# Patient Record
Sex: MALE | Race: WHITE | NOT HISPANIC OR LATINO | URBAN - METROPOLITAN AREA
[De-identification: names, ages, dates, MRNs, and addresses within clinical notes are randomized per-mention and may not be internally consistent; named-entity substitution may affect disease eponyms.]

---
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
## 2024-09-11 ENCOUNTER — INPATIENT (INPATIENT)
Facility: HOSPITAL | Age: 19
LOS: 4 days | Discharge: HOME | DRG: 917 | End: 2024-09-16
Attending: HOSPITALIST | Admitting: HOSPITALIST
Payer: COMMERCIAL

## 2024-09-11 VITALS
SYSTOLIC BLOOD PRESSURE: 161 MMHG | OXYGEN SATURATION: 98 % | WEIGHT: 240.08 LBS | HEIGHT: 76 IN | DIASTOLIC BLOOD PRESSURE: 96 MMHG | RESPIRATION RATE: 18 BRPM | TEMPERATURE: 99 F | HEART RATE: 93 BPM

## 2024-09-11 PROCEDURE — 99285 EMERGENCY DEPT VISIT HI MDM: CPT

## 2024-09-11 NOTE — ED PROVIDER NOTE - PHYSICAL EXAMINATION
Vital Signs: I have reviewed the initial vital signs.  CONSTITUTIONAL: Pt in no acute distress  SKIN: Skin exam is warm and dry, no acute rash.  HEAD: Normocephalic; atraumatic.  EYES: PERRL, EOM intact; conjunctiva and sclera clear.  ENT: No nasal discharge; airway clear. Oropharynx normal.  NECK: Supple; FROM  CARD: S1, S2 normal; no murmurs, gallops, or rubs. Regular rate and rhythm.  RESP: CTAB. No wheezes, rales or rhonchi.  ABD:  soft; non-distended; non-tender; no hepatosplenomegaly.  MSK: Normal ROM. No clubbing, cyanosis or edema.  NEURO: Alert, oriented. Grossly unremarkable. No focal deficits.  PSYCH: Cooperative, appropriate.

## 2024-09-11 NOTE — ED PROVIDER NOTE - EKG/XRAY ADDITIONAL INFORMATION
EKG reviewed by me Dr. Brewer and shows, sinus rhythm, MO/QRS/QTC intervals are in acceptable range, no significant ST/T wave changes noted.

## 2024-09-11 NOTE — ED PROVIDER NOTE - CARE PLAN
Principal Discharge DX:	Medication overdose, intentional self-harm, initial encounter  Secondary Diagnosis:	Suicide attempt   1

## 2024-09-11 NOTE — ED PROVIDER NOTE - ATTENDING APP SHARED VISIT CONTRIBUTION OF CARE
19-year-old male with history of depression, GERD, in the past was on Abilify and Lexapro but has not taken for 1 month, sees psychiatry and therapist regularly, past suicidal attempt and admission, presents with both parents with overdose. Patient states he took 18 pills of 50 mg hydroxyzine at approximately 9:30 PM tonight, due to suicidal thoughts. States initially felt slightly dizzy, however this is resolved. Denies vomiting, abdominal pain, and all other symptoms. On exam, afebrile, hemodynamically stable, saturating well on room air, NAD, well appearing, sitting comfortably in bed, no WOB/tachypnea, speaking full sentences, head NCAT, EOMI grossly, anicteric, MMM, no lesions, RRR, nml S1/S2, no m/r/g, lungs CTAB, no w/r/r, abd soft, NT, ND, nml BS, no rebound or guarding, AAO, CN's 3-12 grossly intact, DA SILVA spontaneously, skin warm, well perfused, no rashes or hives. Patient fully alert and verbal and ambulatory. No evidence of toxidrome/overdose at this time. ECG unremarkable. Toxicology consulted and cleared patient. NAD, well appearing. Signed off care to Dr. BAY Brewer, will f/u labs and psych consult.

## 2024-09-11 NOTE — ED PROVIDER NOTE - OBJECTIVE STATEMENT
19-year-old male past medical history suicide attempt in the past, major depressive disorder, noncompliant with Lexapro and Abilify, takes hydroxyzine 50 mg nightly for sleep, presents after suicide attempt tonight.  Patient states he took 18 pills of 50 mg hydroxyzine 1.5 hours prior to arrival.  Patient only complaining of mild drowsiness and shortness of breath.  Denies chest pain, abdominal pain, N/V.  Patient denies HI.  Patient denies coingestions.

## 2024-09-11 NOTE — ED ADULT TRIAGE NOTE - CHIEF COMPLAINT QUOTE
Pt BIBA from home for suicide attempt   states he took 18 tabs of 50mg hydroxyzine around 930p  now feeling drowsy and nauseous  1:1 initiated in triage

## 2024-09-11 NOTE — ED PROVIDER NOTE - PROGRESS NOTE DETAILS
NAD, well appearing. Signed off care to Dr. BAY Brewer, will f/u labs and psych consult. AE: Patient evaluated by toxicology who recommends obs until the morning for a repeat ECG (evaluation of QTc) and telemetry due to the patient's possible access to escitalopram. Telepsych aware, will eval pt

## 2024-09-11 NOTE — ED PROVIDER NOTE - NSICDXNOFAMILYHX_GEN_ALL_ED
<-- Click to add NO pertinent Family History details… Soft, non-tender, no hepatosplenomegaly, normal bowel sounds detailed exam

## 2024-09-11 NOTE — ED PROVIDER NOTE - CLINICAL SUMMARY MEDICAL DECISION MAKING FREE TEXT BOX
Patient remained stable in ED, labs/EKG findings reviewed and discussed with patient. Discussed with EDOU/OBS provider, patient is admitted to EDOU for further evaluation of her symptoms.

## 2024-09-12 DIAGNOSIS — T50.902A POISONING BY UNSPECIFIED DRUGS, MEDICAMENTS AND BIOLOGICAL SUBSTANCES, INTENTIONAL SELF-HARM, INITIAL ENCOUNTER: ICD-10-CM

## 2024-09-12 DIAGNOSIS — F32.A DEPRESSION, UNSPECIFIED: ICD-10-CM

## 2024-09-12 DIAGNOSIS — T43.591A POISONING BY OTHER ANTIPSYCHOTICS AND NEUROLEPTICS, ACCIDENTAL (UNINTENTIONAL), INITIAL ENCOUNTER: ICD-10-CM

## 2024-09-12 LAB
ALBUMIN SERPL ELPH-MCNC: 5 G/DL — SIGNIFICANT CHANGE UP (ref 3.5–5.2)
ALP SERPL-CCNC: 86 U/L — SIGNIFICANT CHANGE UP (ref 30–115)
ALT FLD-CCNC: 36 U/L — SIGNIFICANT CHANGE UP (ref 0–41)
AMPHET UR-MCNC: NEGATIVE — SIGNIFICANT CHANGE UP
ANION GAP SERPL CALC-SCNC: 12 MMOL/L — SIGNIFICANT CHANGE UP (ref 7–14)
APAP SERPL-MCNC: <5 UG/ML — LOW (ref 10–30)
AST SERPL-CCNC: 26 U/L — SIGNIFICANT CHANGE UP (ref 0–41)
BARBITURATES UR SCN-MCNC: NEGATIVE — SIGNIFICANT CHANGE UP
BASOPHILS # BLD AUTO: 0.06 K/UL — SIGNIFICANT CHANGE UP (ref 0–0.2)
BASOPHILS NFR BLD AUTO: 0.7 % — SIGNIFICANT CHANGE UP (ref 0–1)
BENZODIAZ UR-MCNC: NEGATIVE — SIGNIFICANT CHANGE UP
BILIRUB SERPL-MCNC: 0.2 MG/DL — SIGNIFICANT CHANGE UP (ref 0.2–1.2)
BUN SERPL-MCNC: 10 MG/DL — SIGNIFICANT CHANGE UP (ref 10–20)
CALCIUM SERPL-MCNC: 9.5 MG/DL — SIGNIFICANT CHANGE UP (ref 8.4–10.5)
CHLORIDE SERPL-SCNC: 102 MMOL/L — SIGNIFICANT CHANGE UP (ref 98–110)
CO2 SERPL-SCNC: 24 MMOL/L — SIGNIFICANT CHANGE UP (ref 17–32)
COCAINE METAB.OTHER UR-MCNC: NEGATIVE — SIGNIFICANT CHANGE UP
CREAT SERPL-MCNC: 1 MG/DL — SIGNIFICANT CHANGE UP (ref 0.7–1.5)
EGFR: 110 ML/MIN/1.73M2 — SIGNIFICANT CHANGE UP
EOSINOPHIL # BLD AUTO: 0.21 K/UL — SIGNIFICANT CHANGE UP (ref 0–0.7)
EOSINOPHIL NFR BLD AUTO: 2.3 % — SIGNIFICANT CHANGE UP (ref 0–8)
ETHANOL SERPL-MCNC: <10 MG/DL — SIGNIFICANT CHANGE UP
FENTANYL UR QL: NEGATIVE — SIGNIFICANT CHANGE UP
GLUCOSE SERPL-MCNC: 97 MG/DL — SIGNIFICANT CHANGE UP (ref 70–99)
HCT VFR BLD CALC: 49.6 % — SIGNIFICANT CHANGE UP (ref 42–52)
HGB BLD-MCNC: 16.6 G/DL — SIGNIFICANT CHANGE UP (ref 14–18)
IMM GRANULOCYTES NFR BLD AUTO: 0.3 % — SIGNIFICANT CHANGE UP (ref 0.1–0.3)
LYMPHOCYTES # BLD AUTO: 1.61 K/UL — SIGNIFICANT CHANGE UP (ref 1.2–3.4)
LYMPHOCYTES # BLD AUTO: 17.7 % — LOW (ref 20.5–51.1)
MCHC RBC-ENTMCNC: 30 PG — SIGNIFICANT CHANGE UP (ref 27–31)
MCHC RBC-ENTMCNC: 33.5 G/DL — SIGNIFICANT CHANGE UP (ref 32–37)
MCV RBC AUTO: 89.5 FL — SIGNIFICANT CHANGE UP (ref 80–94)
METHADONE UR-MCNC: NEGATIVE — SIGNIFICANT CHANGE UP
MONOCYTES # BLD AUTO: 0.86 K/UL — HIGH (ref 0.1–0.6)
MONOCYTES NFR BLD AUTO: 9.5 % — HIGH (ref 1.7–9.3)
NEUTROPHILS # BLD AUTO: 6.31 K/UL — SIGNIFICANT CHANGE UP (ref 1.4–6.5)
NEUTROPHILS NFR BLD AUTO: 69.5 % — SIGNIFICANT CHANGE UP (ref 42.2–75.2)
NRBC # BLD: 0 /100 WBCS — SIGNIFICANT CHANGE UP (ref 0–0)
OPIATES UR-MCNC: NEGATIVE — SIGNIFICANT CHANGE UP
PCP SPEC-MCNC: SIGNIFICANT CHANGE UP
PLATELET # BLD AUTO: 268 K/UL — SIGNIFICANT CHANGE UP (ref 130–400)
PMV BLD: 10.5 FL — HIGH (ref 7.4–10.4)
POTASSIUM SERPL-MCNC: 4.2 MMOL/L — SIGNIFICANT CHANGE UP (ref 3.5–5)
POTASSIUM SERPL-SCNC: 4.2 MMOL/L — SIGNIFICANT CHANGE UP (ref 3.5–5)
PROPOXYPHENE QUALITATIVE URINE RESULT: NEGATIVE — SIGNIFICANT CHANGE UP
PROT SERPL-MCNC: 7.6 G/DL — SIGNIFICANT CHANGE UP (ref 6–8)
RBC # BLD: 5.54 M/UL — SIGNIFICANT CHANGE UP (ref 4.7–6.1)
RBC # FLD: 12.4 % — SIGNIFICANT CHANGE UP (ref 11.5–14.5)
SALICYLATES SERPL-MCNC: <0.3 MG/DL — LOW (ref 4–30)
SARS-COV-2 RNA SPEC QL NAA+PROBE: DETECTED
SODIUM SERPL-SCNC: 138 MMOL/L — SIGNIFICANT CHANGE UP (ref 135–146)
WBC # BLD: 9.08 K/UL — SIGNIFICANT CHANGE UP (ref 4.8–10.8)
WBC # FLD AUTO: 9.08 K/UL — SIGNIFICANT CHANGE UP (ref 4.8–10.8)

## 2024-09-12 PROCEDURE — 83735 ASSAY OF MAGNESIUM: CPT

## 2024-09-12 PROCEDURE — 99223 1ST HOSP IP/OBS HIGH 75: CPT

## 2024-09-12 PROCEDURE — 80053 COMPREHEN METABOLIC PANEL: CPT

## 2024-09-12 PROCEDURE — 36415 COLL VENOUS BLD VENIPUNCTURE: CPT

## 2024-09-12 PROCEDURE — 93005 ELECTROCARDIOGRAM TRACING: CPT

## 2024-09-12 PROCEDURE — 93010 ELECTROCARDIOGRAM REPORT: CPT | Mod: 76

## 2024-09-12 PROCEDURE — 99233 SBSQ HOSP IP/OBS HIGH 50: CPT

## 2024-09-12 PROCEDURE — 90792 PSYCH DIAG EVAL W/MED SRVCS: CPT | Mod: 95

## 2024-09-12 PROCEDURE — 85025 COMPLETE CBC W/AUTO DIFF WBC: CPT

## 2024-09-12 RX ORDER — MAGNESIUM, ALUMINUM HYDROXIDE 200-225/5
30 SUSPENSION, ORAL (FINAL DOSE FORM) ORAL EVERY 4 HOURS
Refills: 0 | Status: DISCONTINUED | OUTPATIENT
Start: 2024-09-12 | End: 2024-09-16

## 2024-09-12 RX ORDER — ACETAMINOPHEN 325 MG/1
650 TABLET ORAL EVERY 6 HOURS
Refills: 0 | Status: DISCONTINUED | OUTPATIENT
Start: 2024-09-12 | End: 2024-09-16

## 2024-09-12 NOTE — H&P ADULT - HISTORY OF PRESENT ILLNESS
20yo M w/a PMH of MDD, suicide attempts past, noncompliant with Lexapro and Abilify, takes hydroxyzine 50 mg nightly for sleep, presents after suicide attempt last night.  Patient states he took 18 pills of 50 mg hydroxyzine 1.5 hours prior to arrival yesterday.  Patient only complaining of mild drowsiness and shortness of breath at time but now asymptomatic. He stopped his medications a month and a half ago abruptly  then saw his psych doctor who said its okay to stop the medications since he had no symptoms off of them. He has 4 siblings of which he is friends with. his parents are  and he does not talk much with his dad. He endorses a general disinterest in everything and feels like he isnt good at anything. 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 Endorses some body image issues where people at work the last two days told him he got bigger since coming back. His grandma just went to the nursing home yesterday for placement. However, states this was an impulsive decision.   Denies chest pain, abdominal pain, N/V. Patient denies coingestions.    In the ED,   HDS   labs wnl   Utox negative. COVID +     Admitted for placement into psych.

## 2024-09-12 NOTE — ED ADULT NURSE REASSESSMENT NOTE - NS ED NURSE REASSESS COMMENT FT1
Spoke to mom after she spoke to son's therapist about situation.. Therapist in shock, offered to speak to Dr in hosp if need be-- Therapist --Haylee Colon [phone] // Psychiatrist is Dr. Mcmahan [phone]

## 2024-09-12 NOTE — ED BEHAVIORAL HEALTH ASSESSMENT NOTE - DIFFERENTIAL
major depressive disorder, adjustment disorder, axis 2 pathology, BPD, persistent depressive disorder

## 2024-09-12 NOTE — ED CDU PROVIDER INITIAL DAY NOTE - ATTENDING CONTRIBUTION TO CARE
Patient was signed out to me by Dr. Bowles. Patient remained stable in ED. Labs reviewed and Toxicology on call and Tele Psychiatry on call consulted.   Vitals reviewed.   Lungs: CTA, no wheezing, no crackles.

## 2024-09-12 NOTE — ED CDU PROVIDER DISPOSITION NOTE - CLINICAL COURSE
20yo man h/o depression/anxiety was placed in CDU tox/obs after he presented s/p hydroxyzine ingestion. ED eval was unremarkable, pt was observed and then cleared by tox. Was seen by psych who rec voluntary admission, to which patient is amenable. He was incidentally found to be COVID+, so he was admitted to medicine with psych following.

## 2024-09-12 NOTE — H&P ADULT - ATTENDING COMMENTS
HPI as above.  Interval history: Pt seen and examined at bedside. No cp or sob.  Had sore throat and body aches yesterday, improved today   Vital Signs (24 Hrs):  T(C): 36.4 (09-12-24 @ 07:11), Max: 37.1 (09-11-24 @ 23:14)  HR: 65 (09-12-24 @ 07:11) (65 - 93)  BP: 98/63 (09-12-24 @ 07:11) (98/63 - 161/96)  RR: 18 (09-12-24 @ 07:11) (18 - 18)  SpO2: 97% (09-12-24 @ 07:11) (97% - 98%)  Wt(kg): --  Daily Height in cm: 193.04 (11 Sep 2024 23:14)    Daily     I&O's Summary    PHYSICAL EXAM:  GENERAL: NAD, well-developed  HEAD:  Atraumatic, Normocephalic  EYES: EOMI, PERRLA, conjunctiva and sclera clear  NECK: Supple, No JVD  CHEST/LUNG: Clear to auscultation bilaterally; No wheeze  HEART: Regular rate and rhythm; No murmurs, rubs, or gallops  ABDOMEN: Soft, Nontender, Nondistended; Bowel sounds present  EXTREMITIES:  2+ Peripheral Pulses, No clubbing, cyanosis, or edema  PSYCH: AAOx3  NEUROLOGY: non-focal  SKIN: No rashes or lesions  Labs reviewed      EKG reviewed independently and reviewed read  < from: 12 Lead ECG (09.12.24 @ 03:37) >      Diagnosis Line Normal sinus rhythm  Nonspecific T wave abnormality  Abnormal ECG    < end of copied text >      Plan  #Suicidal attempt- IPP, psych   #covid- asymptomatic- had symptoms yest, may need quarantine depending in IPP   #rest as above. DW resident . agree with plan as above    #Progress Note Handoff  Pending (specify):  follow up psych   Family discussion: house staff updated pt family  Disposition: IPP   Decision to admit the pt is based on acuity as above

## 2024-09-12 NOTE — CONSULT NOTE ADULT - PROBLEM SELECTOR RECOMMENDATION 9
Hydroxyzine is a 1st generation H1-antagonist, and in overdose can cause an sedation, antimuscarinic toxidrome (delirium, agitation, carphologia, tachycardia, mydriasis, urinary retention, dry mucosa, flushed face, hypoactive bowel sounds, seizures) and QRS prolongation.    #Recommendations  - Supportive care, please observe for the above signs and symptoms of toxicity.   - Obtain routine toxicological labs including CBC, CMP, serum acetaminophen, salicylate, ethanol.   - Given access to escitalopram which can cause delayed QTc prolongation and torsades, would observe overnight on telemetry in tox obs and repeat EKG in the morning as long as labs are normal.  - If asymptomatic with normal vitals signs and labs in the AM with normal QTc, cleared from acute toxicological standpoint  - Further medical and/or psychiatric care per primary team    Thank you for involving us in the care of this patient. Assessment and plan discussed with toxicology attending Dr. Andreas Curtis. Please do not hesitate to reach out to the toxicology team for any further questions or concerns.    The On-Call Toxicology Fellow can be reached 24/7 via Pager #[phone]  Please send a 10 digit call back # as [place] cover multiple hospitals    Fidel Tejeda MD  Toxicology Fellow  PGY-4

## 2024-09-12 NOTE — ED BEHAVIORAL HEALTH ASSESSMENT NOTE - DESCRIPTION
calm and cooperative, but somewhat sedated    T(C): 37.1 (09-11-24 @ 23:14)  T(F): 98.7 (09-11-24 @ 23:14), Max: 98.7 (09-11-24 @ 23:14)  HR: 93 (09-11-24 @ 23:14) (93 - 93)  BP: 161/96 (09-11-24 @ 23:14) (161/96 - 161/96)  RR:  (18 - 18)  SpO2:  (98% - 98%)  Wt(kg): -- asthma graduated high school, did some college, works at [employer] lives with mom and sister

## 2024-09-12 NOTE — ED BEHAVIORAL HEALTH ASSESSMENT NOTE - NSBHMSEATTEN_PSY_A_CORE
Time-based billing (NON-critical care)
Normal

## 2024-09-12 NOTE — ED BEHAVIORAL HEALTH ASSESSMENT NOTE - SUMMARY
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    On evaluation patient reports that he overdosed last night in order to die in the context of psychosocial stressors. Although he currently denies suicidal ideation, he has continued depression and he is no longer on psychiatric medications as he d/c Abilify and Lexapro this month. Pt also with some anhedonia and ongoing hopelessness. Due to suicide attempt and ongoing depression pt requires inpatient hospitalization for safety and stabilization. Pt is aware and is agreeable with plan. 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    On evaluation patient reports that he overdosed last night in order to die in the context of psychosocial stressors. Although he currently denies suicidal ideation, he has continued depression and he is no longer on psychiatric medications as he d/c Abilify and Lexapro this month. Pt also with some anhedonia and ongoing hopelessness. Due to suicide attempt and ongoing depression pt requires inpatient hospitalization for safety and stabilization. Pt is aware and is agreeable with plan. Pt will sign 9.13; however, patient also meets criteria for involuntary admission at this time.

## 2024-09-12 NOTE — CONSULT NOTE ADULT - ASSESSMENT
MEDICAL TOXICOLOGY CONSULT    HPI:    This is a 19 year old male presenting for hydroxyzine overdose. Per ED, he reports that 1.5 hours prior to presentation he took intentional overdose of 18 pills of 50mg hydroxyzine. He also is prescribed aripiprazole and escitalopram but denies taking those. He denies any other coingestants. He currently reports feeling drowsy but otherwise asymptomatic. His vitals are normal and on exam he is described as normally awake and alert with normal neuro exam, pupils 3mm and reactive, no flushing of the skin and normal mucous membranes, no rigidity or clonus.    ECG:  rate 79, NSR, QRS 94, QTc 442      PAST MEDICAL & SURGICAL HISTORY:  Asthma      No significant past surgical history          MEDICATION HISTORY:      FAMILY HISTORY:  No pertinent family history in first degree relatives        PHYSICAL EXAM  Vital Signs Last 24 Hrs  T(C): 37.1 (11 Sep 2024 23:14), Max: 37.1 (11 Sep 2024 23:14)  T(F): 98.7 (11 Sep 2024 23:14), Max: 98.7 (11 Sep 2024 23:14)  HR: 93 (11 Sep 2024 23:14) (93 - 93)  BP: 161/96 (11 Sep 2024 23:14) (161/96 - 161/96)  BP(mean): --  RR: 18 (11 Sep 2024 23:14) (18 - 18)  SpO2: 98% (11 Sep 2024 23:14) (98% - 98%)    Parameters below as of 11 Sep 2024 23:14  Patient On (Oxygen Delivery Method): room air        SIGNIFICANT LABORATORY STUDIES:                        16.6   9.08  )-----------( 268      ( 11 Sep 2024 23:52 )             49.6                           RADIOLOGIC STUDIES

## 2024-09-12 NOTE — H&P ADULT - ASSESSMENT
18yo M w/a PMH of MDD, suicide attempts past, noncompliant with Lexapro and Abilify, takes hydroxyzine 50 mg nightly for sleep, presents after suicide attempt last night.  Patient states he took 18 pills of 50 mg hydroxyzine 1.5 hours prior to arrival yesterday.  Admitted for  IPP placement and found to be COVID +.     #Attempted suicide s/p Atarax overdose   - Had 18 pills of Hydroxyzine   - Toxicology following. On ECG,   - Currently asymptomatic. Not agitated, no mydriasis, peeing normally, not constipated. HDS.   - UTOX negative   - psychosocial stressors as per HPI   - Currently no plan to harm himself, states it was an impulsive decision and there was no real reason as to why      Plan   - Psych eval for recs and IPP placement   - Repeat ECG in AM   - 1:1 sit     #COVID   asymptomatic   - Had come cough and sore throat yesterday morning but now asx   - lungs clear to ausculation on room air, HDS.   - monitor off of abx     #HANDOFF    #HCM  - DVT Prophylaxis: SCD  - GI Prophylaxis: NI   - Diet: Regular   - Activity: AAT   - Code Status: Full   - Dispo: Psych, repeat ecg, IPP      Ben Castro   PGY3, Internal Medicine   [facility]   20yo M w/a PMH of MDD, suicide attempts past, noncompliant with Lexapro and Abilify, takes hydroxyzine 50 mg nightly for sleep, presents after suicide attempt last night.  Patient states he took 18 pills of 50 mg hydroxyzine 1.5 hours prior to arrival yesterday.  Admitted for  IPP placement and found to be COVID +.     #Attempted suicide s/p Atarax overdose   - Had 18 pills of Hydroxyzine   - Toxicology following. On ECG,   - Currently asymptomatic. Not agitated, no mydriasis, peeing normally, not constipated. HDS.   - UTOX negative   - psychosocial stressors as per HPI   - Currently no plan to harm himself, states it was an impulsive decision and there was no real reason as to why      Plan   - Psych eval for recs and IPP placement   - Repeat ECG in AM   - 1:1 sit     #COVID   asymptomatic   - Had come cough and sore throat yesterday morning but now asx   - lungs clear to ausculation on room air, HDS.   - monitor off of antibiotics   - depending on IPP rules may need quarantine     #HANDOFF    #HCM  - DVT Prophylaxis: SCD  - GI Prophylaxis: NI   - Diet: Regular   - Activity: AAT   - Code Status: Full   - Dispo: Psych, repeat ecg, IPP      Ben Castro   PGY3, Internal Medicine   [facility]

## 2024-09-12 NOTE — ED BEHAVIORAL HEALTH ASSESSMENT NOTE - RISK ASSESSMENT
Risk factors: suicidality (suicide attempts, frequent SI), depressed mood, stress surrounding work, past IPP, gender, age, stopped medications    Protective factors: has a job, good social supports, currently denies SI/HI, no history of violence, sober

## 2024-09-12 NOTE — ED CDU PROVIDER INITIAL DAY NOTE - CLINICAL SUMMARY MEDICAL DECISION MAKING FREE TEXT BOX
Patient remained stable in ED. Toxicology consulted obtained and recommendations reviewed and placed in EDOU for further evaluation/monitoring.

## 2024-09-12 NOTE — ED BEHAVIORAL HEALTH ASSESSMENT NOTE - HPI (INCLUDE ILLNESS QUALITY, SEVERITY, DURATION, TIMING, CONTEXT, MODIFYING FACTORS, ASSOCIATED SIGNS AND SYMPTOMS)
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     Pt seen and chart reviewed. Pt is calm and cooperative, but somewhat sedated s/p overdose on Hydroxyzine. He states that last night at 9:30pm he tried to kill himself "with a lot of pills of hydroxyzine." He states that he took 18 pills (50mg) in order to die. He stated that he researched the amount of medication needed to die only after he took the pills. Pt states he has done this in the past with different medications. He states that he has frequently wished hew as not alive. Yesterday, he was talking to his brother about life, and said that life is unbearable and it "spiraled" and he decided impulsively to take the overdose. He states that he still feels really sad. He stated that he just started work 2 days ago, after taking a long break. States he felt pressure to return to work and can "barely go to work." He reports good sleep and normal appetite. Denies NSSIB. States that right now he does not have suicidal ideation but he feels very tired. He reports that he has a GF and close friends, but has not been seeing them much recently. He went to [place] this summer with them and it was not as fun as he thought it would be. States that his actions were an "accumulation of stuff." He sees a psychiatrist Dr. Montenegro on [facility] and therapist Vicki. Was taking Abilify, Lexapro, and Vistaril, but a month ago went off Abilify and Lexapro due to concern for weight gain and now is only on Vistaril. Denies ETOH/Weed/illicit substance use. No access to guns. No auditory/visual hallucinations. No overt paranoia. No signs of symptoms of tay. He is not RIS. He denies homicidal ideation. Has never been in a fight and has never been violent.     Collateral received from patient's mother by MSW with patient's permission. See  note for more details.

## 2024-09-12 NOTE — ED ADULT NURSE REASSESSMENT NOTE - NS ED NURSE REASSESS COMMENT FT1
Had conversation with mom-- pt had complaints of cough & congestion yesterday prior to arrival.. When mom went home last night, pt admitted to taking meds to commit suicide. This is 2nd attempt now. First time pt hospitalized and sent to psych unit... MD's working on placement for this visit. ...    No IV present. Pt A+Ox4. As per mom has been sleeping since last night.

## 2024-09-12 NOTE — ED BEHAVIORAL HEALTH ASSESSMENT NOTE - PSYCHIATRIC ISSUES AND PLAN (INCLUDE STANDING AND PRN MEDICATION)
PRNS: Haldol 5/Ativan 2/Bendaryl 50 po/IM for agitation/severe agitation; defer starting standing medications to primary team though patient would benefit from  SSRI or similar medication, collateral from OP psychiatrist in AM will be useful

## 2024-09-12 NOTE — ED BEHAVIORAL HEALTH ASSESSMENT NOTE - OTHER PAST PSYCHIATRIC HISTORY (INCLUDE DETAILS REGARDING ONSET, COURSE OF ILLNESS, INPATIENT/OUTPATIENT TREATMENT)
IPP at [facility] in June 23 for five days and in September 2023 he went to a MH program in [place] for 3-4 weeks

## 2024-09-12 NOTE — H&P ADULT - CLICK TO LAUNCH ORM
Dear Jenni Rogers,     [facility] is committed to your overall health.  Our records indicate that you are due for an annual checkup with your primary care provider,  Dr. Kaylee Foote MD.  Please call [phone] to schedule a routine physical exam.  You can also schedule your appointment via your [facility]. You may also be due for the following test and/or procedures:    Health Maintenance Due   Topic Date Due    TETANUS VACCINE  10/10/1977    Colonoscopy  10/10/2009    Mammogram  06/15/2018       Please be prepared to sign release of information so that we obtain medical records for care that you received outside of [facility]. This is done to make sure your medical record is complete and that you receive the best of care. Thank you.     If you have chosen another Primary Care Physician please contact us so that your medical records can be updated.       If you have had any of the above done at another facility, please let us know by calling [phone]; so that we can update your record.  We will add these results to your chart if you fax them to the fax number listed below.  If you have any questions, please call [phone].    Thanks,       Additional Information  If you have questions, you can email [email or web address] or call [phone]  to talk to our [facility] staff. Remember, [facility] is NOT to be used for urgent needs. For medical emergencies, dial 911.        
.

## 2024-09-12 NOTE — ED BEHAVIORAL HEALTH NOTE - BEHAVIORAL HEALTH NOTE
This writer spoke with patient's mother, Amy ([phone]) to obtain collateral information. Patient is a 20 y.o male presenting to the ED brought in by ambulance following a suicide attempt via overdose. Patient reported to his girlfriend via phone that he had ingested medication; girlfriend then contacted patient's sister, who contacted patient's mother's boyfriend who activated 911. Patient's mother was not home at the time ,but met patient at ED.  Patient has hx of overdose attempt 1.5 yrs ago, per mom, however mom expressed this attempt seemed more severe. Patient is domiciled with mom and sister. Maternal grandma moved out of the home into a nursing facility as of yesterday, which mom believed to be triggering for the patient. Per collateral, she was surprised that patient had attempted to end his life, as she felt he had been doing better and appeared to be happier. Only one IPP admission at [facility] 1.5 years ago following OD attempt. Patient is followed by psychiatrist and therapist at [facility] outpatient ("[facility] on [facility]. Mom was unsure of psychiatrist name and names/dosages of medication. She did report that patient discontinued 2/3 of his medications about 1.5 months ago, as he did not like the side effects. 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 Patient has only been back at work for 2 days. Pt's dx include depression and anxiety. According to mom, patient expressed to his brother, whom he is very close with, that he took the medication in the way that he did believing it would just stop his heart and it would be a peaceful death. Per mom, patient expresses a desire to die, however, is afraid of death. Brother contact information Minesh [phone]. Collateral is concerned for patient's safety and feels he is at risk. This writer spoke with patient's mother, Amy ([phone]) to obtain collateral information. Patient is a 20 y.o male presenting to the ED brought in by ambulance following a suicide attempt via overdose. Patient reported to his girlfriend via phone that he had ingested medication; girlfriend then contacted patient's sister, who contacted patient's mother's boyfriend who activated 911. Patient's mother was not home at the time ,but met patient at ED.  Patient has hx of overdose attempt 1.5 yrs ago, per mom, however mom expressed this attempt seemed more severe. Patient is domiciled with mom and sister. Maternal grandma moved out of the home into a nursing facility as of yesterday, which mom believed to be triggering for the patient. Per collateral, she was surprised that patient had attempted to end his life, as she felt he had been doing better and appeared to be happier. Only one IPP admission at [facility] 1.5 years ago following OD attempt. Patient is followed by psychiatrist and therapist at [facility] outpatient ("[facility] on [facility]. Mom was unsure of psychiatrist name and names/dosages of medication. She did report that patient discontinued 2/3 of his medications about 1.5 months ago, as he did not like the side effects. 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 Patient has only been back at work for 2 days. Pt's dx include depression and anxiety. According to mom, patient expressed to his brother, whom he is very close with, that he took the medication in the way that he did believing it would just stop his heart and it would be a peaceful death. Per mom, patient expresses a desire to die, however, is afraid of death. Brother contact information Minesh [phone]. Collateral is concerned for patient's safety and feels he is at risk.

## 2024-09-12 NOTE — ED ADULT NURSE NOTE - NSFALLUNIVINTERV_ED_ALL_ED
Bed/Stretcher in lowest position, wheels locked, appropriate side rails in place/Call bell, personal items and telephone in reach/Instruct patient to call for assistance before getting out of bed/chair/stretcher/Non-slip footwear applied when patient is off stretcher/[place] to call system/Physically safe environment - no spills, clutter or unnecessary equipment/Purposeful proactive rounding/Room/bathroom lighting operational, light cord in reach

## 2024-09-12 NOTE — H&P ADULT - NSHPLABSRESULTS_GEN_ALL_CORE
.  LABS:                         16.6   9.08  )-----------( 268      ( 11 Sep 2024 23:52 )             49.6     09-11    138  |  102  |  10  ----------------------------<  97  4.2   |  24  |  1.0    Ca    9.5      11 Sep 2024 23:52    TPro  7.6  /  Alb  5.0  /  TBili  0.2  /  DBili  x   /  AST  26  /  ALT  36  /  AlkPhos  86  09-11      Urinalysis Basic - ( 11 Sep 2024 23:52 )    Color: x / Appearance: x / SG: x / pH: x  Gluc: 97 mg/dL / Ketone: x  / Bili: x / Urobili: x   Blood: x / Protein: x / Nitrite: x   Leuk Esterase: x / RBC: x / WBC x   Sq Epi: x / Non Sq Epi: x / Bacteria: x            RADIOLOGY, EKG & ADDITIONAL TESTS: Reviewed.

## 2024-09-12 NOTE — PATIENT PROFILE ADULT - NSPRESCRALCSIXMORE_GEN_A_NUR
RT Inhaler-Nebulizer Bronchodilator Protocol Note    There is a bronchodilator order in the chart from a provider indicating to follow the RT Bronchodilator Protocol and there is an “Initiate RT Inhaler-Nebulizer Bronchodilator Protocol” order as well (see protocol at bottom of note).    CXR Findings:  XR CHEST PORTABLE    Result Date: 3/6/2024  No acute cardiopulmonary process.       The findings from the last RT Protocol Assessment were as follows:   History Pulmonary Disease: Chronic pulmonary disease  Respiratory Pattern: Regular pattern and RR 12-20 bpm  Breath Sounds: Inspiratory and expiratory or bilateral wheezing and/or rhonchi  Cough: Strong, spontaneous, non-productive  Indication for Bronchodilator Therapy: On home bronchodilators  Bronchodilator Assessment Score: 8    Aerosolized bronchodilator medication orders have been revised according to the RT Inhaler-Nebulizer Bronchodilator Protocol below.    Respiratory Therapist to perform RT Therapy Protocol Assessment initially then follow the protocol.  Repeat RT Therapy Protocol Assessment PRN for score 0-3 or on second treatment, BID, and PRN for scores above 3.    No Indications - adjust the frequency to every 6 hours PRN wheezing or bronchospasm, if no treatments needed after 48 hours then discontinue using Per Protocol order mode.     If indication present, adjust the RT bronchodilator orders based on the Bronchodilator Assessment Score as indicated below.  Use Inhaler orders unless patient has one or more of the following: on home nebulizer, not able to hold breath for 10 seconds, is not alert and oriented, cannot activate and use MDI correctly, or respiratory rate 25 breaths per minute or more, then use the equivalent nebulizer order(s) with same Frequency and PRN reasons based on the score.  If a patient is on this medication at home then do not decrease Frequency below that used at home.    0-3 - enter or revise RT bronchodilator order(s) to  Less than monthly

## 2024-09-13 ENCOUNTER — TRANSCRIPTION ENCOUNTER (OUTPATIENT)
Age: 19
End: 2024-09-13

## 2024-09-13 LAB
ALBUMIN SERPL ELPH-MCNC: 4.4 G/DL — SIGNIFICANT CHANGE UP (ref 3.5–5.2)
ALP SERPL-CCNC: 81 U/L — SIGNIFICANT CHANGE UP (ref 30–115)
ALT FLD-CCNC: 32 U/L — SIGNIFICANT CHANGE UP (ref 0–41)
ANION GAP SERPL CALC-SCNC: 10 MMOL/L — SIGNIFICANT CHANGE UP (ref 7–14)
AST SERPL-CCNC: 16 U/L — SIGNIFICANT CHANGE UP (ref 0–41)
BASOPHILS # BLD AUTO: 0.05 K/UL — SIGNIFICANT CHANGE UP (ref 0–0.2)
BASOPHILS NFR BLD AUTO: 0.7 % — SIGNIFICANT CHANGE UP (ref 0–1)
BILIRUB SERPL-MCNC: 0.6 MG/DL — SIGNIFICANT CHANGE UP (ref 0.2–1.2)
BUN SERPL-MCNC: 9 MG/DL — LOW (ref 10–20)
CALCIUM SERPL-MCNC: 9.2 MG/DL — SIGNIFICANT CHANGE UP (ref 8.4–10.5)
CHLORIDE SERPL-SCNC: 103 MMOL/L — SIGNIFICANT CHANGE UP (ref 98–110)
CO2 SERPL-SCNC: 28 MMOL/L — SIGNIFICANT CHANGE UP (ref 17–32)
CREAT SERPL-MCNC: 1 MG/DL — SIGNIFICANT CHANGE UP (ref 0.7–1.5)
EGFR: 110 ML/MIN/1.73M2 — SIGNIFICANT CHANGE UP
EOSINOPHIL # BLD AUTO: 0.3 K/UL — SIGNIFICANT CHANGE UP (ref 0–0.7)
EOSINOPHIL NFR BLD AUTO: 4.3 % — SIGNIFICANT CHANGE UP (ref 0–8)
GLUCOSE SERPL-MCNC: 93 MG/DL — SIGNIFICANT CHANGE UP (ref 70–99)
HCT VFR BLD CALC: 47.8 % — SIGNIFICANT CHANGE UP (ref 42–52)
HGB BLD-MCNC: 15.8 G/DL — SIGNIFICANT CHANGE UP (ref 14–18)
IMM GRANULOCYTES NFR BLD AUTO: 0.3 % — SIGNIFICANT CHANGE UP (ref 0.1–0.3)
LYMPHOCYTES # BLD AUTO: 2.43 K/UL — SIGNIFICANT CHANGE UP (ref 1.2–3.4)
LYMPHOCYTES # BLD AUTO: 34.8 % — SIGNIFICANT CHANGE UP (ref 20.5–51.1)
MAGNESIUM SERPL-MCNC: 2.1 MG/DL — SIGNIFICANT CHANGE UP (ref 1.8–2.4)
MCHC RBC-ENTMCNC: 29.9 PG — SIGNIFICANT CHANGE UP (ref 27–31)
MCHC RBC-ENTMCNC: 33.1 G/DL — SIGNIFICANT CHANGE UP (ref 32–37)
MCV RBC AUTO: 90.5 FL — SIGNIFICANT CHANGE UP (ref 80–94)
MONOCYTES # BLD AUTO: 0.91 K/UL — HIGH (ref 0.1–0.6)
MONOCYTES NFR BLD AUTO: 13 % — HIGH (ref 1.7–9.3)
NEUTROPHILS # BLD AUTO: 3.28 K/UL — SIGNIFICANT CHANGE UP (ref 1.4–6.5)
NEUTROPHILS NFR BLD AUTO: 46.9 % — SIGNIFICANT CHANGE UP (ref 42.2–75.2)
NRBC # BLD: 0 /100 WBCS — SIGNIFICANT CHANGE UP (ref 0–0)
PLATELET # BLD AUTO: 238 K/UL — SIGNIFICANT CHANGE UP (ref 130–400)
PMV BLD: 10.7 FL — HIGH (ref 7.4–10.4)
POTASSIUM SERPL-MCNC: 4.6 MMOL/L — SIGNIFICANT CHANGE UP (ref 3.5–5)
POTASSIUM SERPL-SCNC: 4.6 MMOL/L — SIGNIFICANT CHANGE UP (ref 3.5–5)
PROT SERPL-MCNC: 6.7 G/DL — SIGNIFICANT CHANGE UP (ref 6–8)
RBC # BLD: 5.28 M/UL — SIGNIFICANT CHANGE UP (ref 4.7–6.1)
RBC # FLD: 12.4 % — SIGNIFICANT CHANGE UP (ref 11.5–14.5)
SODIUM SERPL-SCNC: 141 MMOL/L — SIGNIFICANT CHANGE UP (ref 135–146)
WBC # BLD: 6.99 K/UL — SIGNIFICANT CHANGE UP (ref 4.8–10.8)
WBC # FLD AUTO: 6.99 K/UL — SIGNIFICANT CHANGE UP (ref 4.8–10.8)

## 2024-09-13 PROCEDURE — 99223 1ST HOSP IP/OBS HIGH 75: CPT

## 2024-09-13 PROCEDURE — 99233 SBSQ HOSP IP/OBS HIGH 50: CPT

## 2024-09-13 RX ORDER — CHLORHEXIDINE GLUCONATE 40 MG/ML
1 SOLUTION TOPICAL
Refills: 0 | Status: DISCONTINUED | OUTPATIENT
Start: 2024-09-13 | End: 2024-09-16

## 2024-09-13 NOTE — DISCHARGE NOTE PROVIDER - NSDCFUADDINST_GEN_ALL_CORE_FT
Please follow up with your primary care doctor after you are discharged from the hospital. Please follow up with your primary care doctor after you are discharged from the hospital.  Isolation precautions as per protocol

## 2024-09-13 NOTE — DISCHARGE NOTE PROVIDER - NSDCMRMEDTOKEN_GEN_ALL_CORE_FT
Atarax 50 mg oral tablet: 1 tab(s) orally once a day (at bedtime)   Atarax 50 mg oral tablet: 1 tab(s) orally once a day (at bedtime)  buPROPion 150 mg/24 hours (XL) oral tablet, extended release: 1 tab(s) orally once a day   buPROPion 150 mg/24 hours (XL) oral tablet, extended release: 1 tab(s) orally once a day

## 2024-09-13 NOTE — DISCHARGE NOTE PROVIDER - HOSPITAL COURSE
18yo M w/a PMH of MDD, suicide attempts past, noncompliant with Lexapro and Abilify, takes hydroxyzine 50 mg nightly for sleep, presents after suicide attempt last night.  Patient states he took 18 pills of 50 mg hydroxyzine 1.5 hours prior to arrival yesterday.  Admitted for  IPP placement and found to be COVID +. Denies active suicidal ideation at this time.     #Attempted suicide s/p Atarax overdose   - Had 18 pills of Hydroxyzine   - Toxicology following. On ECG,   - Currently asymptomatic. Not agitated, no mydriasis, peeing normally, not constipated. HDS.   - UTOX negative   - psychosocial stressors as per HPI   - Currently no plan to harm himself, states it was an impulsive decision and there was no real reason as to why      - 1:1 sit     #COVID   asymptomatic EXCEPT mild sore throat, congestion of sinuses, and slight loss of taste  - lungs clear to ausculation on room air, HDS.   - monitored off abx  - airborne precautions      #HCM  - DVT Prophylaxis: SCD  - GI Prophylaxis: NI   - Diet: Regular   - Activity: AAT   - Code Status: Full     - Dispo: voluntary inpatient psychiatry admission 20yo M w/a PMH of MDD, suicide attempts past, noncompliant with Lexapro and Abilify, takes hydroxyzine 50 mg nightly for sleep, presented after suicide attempt last night.  Patient stated he took 18 pills of 50 mg hydroxyzine 1.5 hours prior to arrival to ED.  Admitted for IPP placement and found to be COVID +. Denies active suicidal ideation at this time.     #Attempted suicide s/p Atarax overdose   - States that he took 18 pills of Hydroxyzine   - Toxicology followed. On ECG,   - Currently asymptomatic. Not agitated, no mydriasis, peeing normally, not constipated. HDS.   - UTOX negative   - psychosocial stressors as per HPI   - Currently no plan to harm himself, states it was an impulsive decision and there was no real reason as to why      - 1:1 sit     #COVID   asymptomatic EXCEPT mild sore throat, congestion of sinuses, and slight loss of taste  - lungs clear to ausculation on room air, HDS.   - monitored off abx  - airborne precautions  -clinically stable    #HCM  - DVT Prophylaxis: SCD  - GI Prophylaxis: not indicated  - Diet: Regular   - Activity: AAT   - Code Status: Full       - Dispo: voluntary inpatient psychiatry admission once bed available. Medicine attending -    Patient seen and examined this morning  lying in bed   in nad  no respiratory symptoms  in a good mood; calm behavior    Vital Signs Last 24 Hrs  T(C): 36.5 (15 Sep 2024 23:37), Max: 37.1 (15 Sep 2024 15:00)  T(F): 97.7 (15 Sep 2024 23:37), Max: 98.7 (15 Sep 2024 15:00)  HR: 84 (15 Sep 2024 23:37) (84 - 91)  BP: 121/77 (15 Sep 2024 23:37) (121/77 - 126/79)  BP(mean): 92 (15 Sep 2024 20:55) (92 - 92)  RR: 18 (15 Sep 2024 23:37) (18 - 18)  SpO2: 100% (15 Sep 2024 23:37) (100% - 100%)    Parameters below as of 15 Sep 2024 23:37  Patient On (Oxygen Delivery Method): room air    18yo M w/a PMH of MDD, suicide attempts past, noncompliant with Lexapro and Abilify, takes hydroxyzine 50 mg nightly for sleep, presented after suicide attempt last night.  Patient stated he took 18 pills of 50 mg hydroxyzine 1.5 hours prior to arrival to ED.  Admitted for IPP placement and found to be COVID +. Denies active suicidal ideation at this time.     #Attempted suicide s/p Atarax overdose   - States that he took 18 pills of Hydroxyzine   - Toxicology followed. On ECG,   - Currently asymptomatic. Not agitated, no mydriasis, peeing normally, not constipated. HDS.   - UTOX negative   - psychosocial stressors as per HPI   - Currently no plan to harm himself, states it was an impulsive decision and there was no real reason as to why      - 1:1 sit     #COVID   asymptomatic EXCEPT mild sore throat, congestion of sinuses, and slight loss of taste  - lungs clear to ausculation on room air, HDS.   - monitored off abx  - airborne precautions  -clinically stable    - Dispo: voluntary inpatient psychiatry admission today    D/C today; d/c planning took over 75 min  d/c papers edited by me  discussed d/c plan and all instructions in detail

## 2024-09-13 NOTE — DISCHARGE NOTE PROVIDER - CARE PROVIDER_API CALL
Roel Cota  Internal Medicine  [address] [place] [address]  Phone: [phone]  Fax: [phone]  Established Patient  Follow Up Time: 2 weeks

## 2024-09-13 NOTE — DISCHARGE NOTE PROVIDER - NSDCCPGOAL_GEN_ALL_CORE_FT
To get better and follow your care plan as instructed.
Normal rate, regular rhythm.  Heart sounds S1, S2.  No murmurs, rubs or gallops.

## 2024-09-13 NOTE — DISCHARGE NOTE PROVIDER - NSDCCPCAREPLAN_GEN_ALL_CORE_FT
PRINCIPAL DISCHARGE DIAGNOSIS  Diagnosis: Medication overdose, intentional self-harm, initial encounter  Assessment and Plan of Treatment: You were found to have ingested an excessive amount of medication in an attempted overdose. Medical staff closely monitored you for signs of overdose and provided the necessary treatments.      SECONDARY DISCHARGE DIAGNOSES  Diagnosis: Suicide attempt  Assessment and Plan of Treatment:     Diagnosis: 2019 novel coronavirus disease (COVID-19)  Assessment and Plan of Treatment:      PRINCIPAL DISCHARGE DIAGNOSIS  Diagnosis: Medication overdose, intentional self-harm, initial encounter  Assessment and Plan of Treatment: You were found to have ingested an excessive amount of medication in an attempted overdose. Medical staff closely monitored you for signs of overdose and provided the necessary treatments.  You will be going to inpatient psych unit for further treatment and monitoring.

## 2024-09-13 NOTE — PROGRESS NOTE ADULT - ATTENDING COMMENTS
My note supersedes the resident's note in the event of a discrepancy -    Patient seen and examined this morning  lying in bed  in nad   on 1:1 sit for suicide attempt    Vital Signs Last 24 Hrs  T(C): 36.7 (13 Sep 2024 15:25), Max: 36.8 (12 Sep 2024 20:35)  T(F): 98.1 (13 Sep 2024 15:25), Max: 98.2 (12 Sep 2024 20:35)  HR: 66 (13 Sep 2024 15:25) (62 - 83)  BP: 112/70 (13 Sep 2024 15:25) (112/70 - 131/79)  BP(mean): --  RR: 18 (13 Sep 2024 15:25) (18 - 18)  SpO2: 96% (13 Sep 2024 15:25) (96% - 98%)    Parameters below as of 13 Sep 2024 15:25  Patient On (Oxygen Delivery Method): room air    20yo M w/a PMH of MDD, suicide attempts past, noncompliant with Lexapro and Abilify, takes hydroxyzine 50 mg nightly for sleep, presents after suicide attempt last night.  Patient states he took 18 pills of 50 mg hydroxyzine 1.5 hours prior to arrival yesterday.  Admitted for  IPP placement and found to be COVID +.     #Attempted suicide s/p Atarax overdose   - Had 18 pills of Hydroxyzine   - Toxicology following. On ECG,   - Currently asymptomatic. Not agitated, no mydriasis, peeing normally, not constipated. HDS.   - UTOX negative   - psych following - patient signed voluntary form for IPP  - Repeat ECG in AM   - continue 1:1 sit; safety    #COVID   - asymptomatic   - isolation precautions  - IPP arranging for isolation bed     Progress Note Handoff  Pending Consults: psych  Pending Tests: none  Pending Results: none  Family Discussion: Discussed labs, meds, psych follow up and dc to IPP with pt and medical staff. PFD for 24hrs.   Disposition: Home_____/SNF______/Other__IPP___/Unknown at this time_____  Spent over 55 min reviewing chart, speaking with patient/family and on coordinating patient care during interdisciplinary rounds

## 2024-09-13 NOTE — BH CONSULTATION LIAISON PROGRESS NOTE - NSBHTIMEACTIVITIESPERFORMED_PSY_A_CORE
Preparing to see the patient, counseling or educating patient, Documentation, care coordination, reviewing separately obtained history, communicating with other health care professionals

## 2024-09-13 NOTE — PROGRESS NOTE ADULT - ASSESSMENT
20yo M w/a PMH of MDD, suicide attempts past, noncompliant with Lexapro and Abilify, takes hydroxyzine 50 mg nightly for sleep, presents after suicide attempt last night.  Patient states he took 18 pills of 50 mg hydroxyzine 1.5 hours prior to arrival yesterday.  Admitted for  IPP placement and found to be COVID +. Denies active suicidal ideation at this time.     #Attempted suicide s/p Atarax overdose   - Had 18 pills of Hydroxyzine   - Toxicology following. On ECG,   - Currently asymptomatic. Not agitated, no mydriasis, peeing normally, not constipated. HDS.   - UTOX negative   - psychosocial stressors as per HPI   - Currently no plan to harm himself, states it was an impulsive decision and there was no real reason as to why      - 1:1 sit     #COVID   asymptomatic EXCEPT mild sore throat, congestion of sinuses, and slight loss of taste  - lungs clear to ausculation on room air, HDS.   - d/c'd abx  - airborne precautions      #HCM  - DVT Prophylaxis: SCD  - GI Prophylaxis: NI   - Diet: Regular   - Activity: AAT   - Code Status: Full     - Dispo: Pending voluntary inpatient psychiatry admission 18yo M w/a PMH of MDD, suicide attempts past, noncompliant with Lexapro and Abilify, takes hydroxyzine 50 mg nightly for sleep, presents after suicide attempt last night.  Patient states he took 18 pills of 50 mg hydroxyzine 1.5 hours prior to arrival yesterday.  Admitted for  IPP placement and found to be COVID +. Denies active suicidal ideation at this time.     #Attempted suicide s/p Atarax overdose   - Had 18 pills of Hydroxyzine   - Toxicology following. On ECG,   - Currently asymptomatic. Not agitated, no mydriasis, peeing normally, not constipated. HDS.   - UTOX negative   - psychosocial stressors as per HPI   - Currently no plan to harm himself, states it was an impulsive decision and there was no real reason as to why      - 1:1 sit     #COVID   asymptomatic EXCEPT mild sore throat, congestion of sinuses, and slight loss of taste  - lungs clear to ausculation on room air, HDS.   - monitor off abx  - airborne precautions      #HCM  - DVT Prophylaxis: SCD  - GI Prophylaxis: NI   - Diet: Regular   - Activity: AAT   - Code Status: Full     - Dispo: Pending voluntary inpatient psychiatry admission

## 2024-09-14 PROCEDURE — 99232 SBSQ HOSP IP/OBS MODERATE 35: CPT

## 2024-09-14 RX ORDER — BUPROPION HYDROCHLORIDE 150 MG/1
150 TABLET ORAL DAILY
Refills: 0 | Status: DISCONTINUED | OUTPATIENT
Start: 2024-09-14 | End: 2024-09-16

## 2024-09-14 RX ADMIN — BUPROPION HYDROCHLORIDE 150 MILLIGRAM(S): 150 TABLET ORAL at 12:25

## 2024-09-14 NOTE — PROGRESS NOTE ADULT - ASSESSMENT
18yo M w/a PMH of MDD, suicide attempts past, noncompliant with Lexapro and Abilify, takes hydroxyzine 50 mg nightly for sleep, presents after suicide attempt last night.  Patient states he took 18 pills of 50 mg hydroxyzine 1.5 hours prior to arrival yesterday.  Admitted for  IPP placement and found to be COVID +.     #Attempted suicide s/p Atarax overdose   - Had 18 pills of Hydroxyzine   - Currently asymptomatic. Not agitated, no mydriasis, peeing normally, not constipated. HDS.   - UTOX negative   - psych following - patient signed voluntary form for IPP - await bed  - Repeat ECG in AM   - continue 1:1 sit; safety    #COVID   - asymptomatic   - isolation precautions  - IPP arranging for isolation bed - likely on Monday - discussed with Psychiatrist today     Progress Note Handoff  Pending Consults: psych  Pending Tests: none  Pending Results: none  Family Discussion: Discussed labs, meds, psych follow up and dc to IPP with pt and medical staff. PFD for 24hrs.   Disposition: Home_____/SNF______/Other__IPP___/Unknown at this time_____  Spent over 55 min reviewing chart, speaking with patient/family and on coordinating patient care during interdisciplinary rounds .    Please call me with any questions at extension [phone]

## 2024-09-15 VITALS
DIASTOLIC BLOOD PRESSURE: 77 MMHG | HEART RATE: 84 BPM | OXYGEN SATURATION: 100 % | SYSTOLIC BLOOD PRESSURE: 121 MMHG | TEMPERATURE: 98 F | RESPIRATION RATE: 18 BRPM

## 2024-09-15 PROCEDURE — 93010 ELECTROCARDIOGRAM REPORT: CPT

## 2024-09-15 PROCEDURE — 99232 SBSQ HOSP IP/OBS MODERATE 35: CPT

## 2024-09-15 RX ADMIN — BUPROPION HYDROCHLORIDE 150 MILLIGRAM(S): 150 TABLET ORAL at 12:13

## 2024-09-15 NOTE — BH CONSULTATION LIAISON PROGRESS NOTE - NSICDXBHTERTIARYDX_PSY_ALL_CORE
R/O MDD (major depressive disorder)   F32.9  R/O Bipolar disorder   F31.9  R/O Borderline personality disorder   F60.3  

## 2024-09-15 NOTE — BH CONSULTATION LIAISON PROGRESS NOTE - NSBHATTESTCOMMENTATTENDFT_PSY_A_CORE
Patient seen at bedside. Patient reports he continues to feel low and reports that this is a rather chronic issue. He reports his suicide attempt was rather impulsive, reporting that he was having feelings of inadequacy. He denied reports that his previous attempt was also impulsive. He reported previous "normal" relationships and denied having emotional mood swings. He relayed a hx of ADHD which was previously treated with stretera and reported it was stopped due to upset stomach. We discussed possible treatment options and patient was amenable to trial of buproprion.     RECOMMENDATIONS:   1. Continue patient on 1:1.  2. Patient requires inpatient psychiatric admission - 9.13 signed, Pending bed availability at [facility]  3. Recommend Bupropion 150mg PO daily   4. CL will continue to follow.
Patient seen at bedside. Patient reports he continues to feel low and reports that this is a rather chronic issue. He reports his suicide attempt was rather impulsive, reporting that he was having feelings of inadequacy. He denied reports that his previous attempt was also impulsive. He reported previous "normal" relationships and denied having emotional mood swings. He relayed a hx of ADHD which was previously treated with stretera and reported it was stopped due to upset stomach. We discussed possible treatment options and patient was amenable to trial of buproprion.     RECOMMENDATIONS:   1. Continue patient on 1:1.  2. Patient requires inpatient psychiatric admission - 9.13 signed, Pending bed availability at [facility]  3. Recommend Bupropion 150mg PO daily   4. CL will continue to follow.
Chart reviewed. Patient endorsing depressive mood. Describes overdose was impulsive, but endorsed poor baseline self control. Acknowledged need to change due to high baseline acute/chronic risk factors. Ambivalent about [facility], but was willing to sign voluntary at this time.    Adequate grooming and hygiene, appearance consistent with chronological age. Alert and oriented to person, place, time and situation. Behavior is appropriate. No involuntary movements. Eye contact is appropriate. Speech is non-pressured and of normal amount, rate, volume and articulation. Mood is "okay" with blunted affect. Flow of thought is linear. Content of thought is unremarkable and no delusional or obsessive material is presented. Denies thoughts of harm to self or others. Denies auditory or visual hallucinations. Limited insight/judgement.     Dx:  Unspecified mood disorder    Recommendations:  1. Continue patient on 1:1.    2. Patient requires inpatient psychiatric admission - 9.13 signed.--Pending bed availability at [facility], potentially available Monday.    3. CL will continue to follow.

## 2024-09-15 NOTE — PROGRESS NOTE ADULT - ASSESSMENT
20yo M w/a PMH of MDD, suicide attempts past, noncompliant with Lexapro and Abilify, takes hydroxyzine 50 mg nightly for sleep, presents after suicide attempt last night.  Patient states he took 18 pills of 50 mg hydroxyzine 1.5 hours prior to arrival yesterday.  Admitted for  IPP placement and found to be COVID +.     #Attempted suicide s/p Atarax overdose   - injested 18 pills of Hydroxyzine   - Currently asymptomatic. Not agitated, no mydriasis, peeing normally, not constipated. HDS.   - UTOX negative   - psych following - patient signed voluntary form for IPP - await isolation bed  - Repeat ECG in AM   - continue 1:1 sit; safety    #COVID   - asymptomatic   - isolation precautions  - IPP arranging for isolation bed - likely on Monday - discussed with Psychiatrist today     Progress Note Handoff  Pending Consults: psych  Pending Tests: none  Pending Results: none  Family Discussion: Discussed labs, meds, psych follow up and dc to IPP with pt and medical staff. PFD for 24hrs.   Disposition: Home_____/SNF______/Other__IPP___/Unknown at this time_____  Spent over 55 min reviewing chart, speaking with patient/family and on coordinating patient care during interdisciplinary rounds .    Please call me with any questions at extension [phone]

## 2024-09-15 NOTE — BH CONSULTATION LIAISON PROGRESS NOTE - NSBHASSESSMENTFT_PSY_ALL_CORE
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    On today's evaluation, patient is linear, cooperative, and pleasant. Patient classifies his overdose as a suicide attempt in the context of worsening psychosocial stressors; appearing impulsive. Patient has depressive symptoms (chronic anhedonia and feelings of worthlessness), with prior ADHD diagnosis, so will trial Bupropion 150mg PO daily to see effectiveness of reducing symptoms of both depression and ADHD. Patient is at an elevated acute and chronic suicide risk due to current depressive symptoms, impulsivity, hopelessness, recent psychosocial stressors, and prior SA. As patient is an acute risk of harm to self, patient requires inpatient hospitalization for safety and stabilization. Pt is aware and is agreeable with plan. Pt signed 9.13 - pending bed availability at [facility].    Impression: Unspecified mood disorder  R/o major depressive disorder  R/o borderline personality disorder     RECOMMENDATIONS:   1. Continue patient on 1:1.    2. Patient requires inpatient psychiatric admission - 9.13 signed.  -Pending bed availability at [facility]    3. Recommend Bupropion 150mg PO daily     4. CL will continue to follow. 
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    On past evaluation, patient classifies his overdose as a suicide attempt in the context of worsening psychosocial stressors; appearing impulsive. Patient has depressive symptoms (chronic anhedonia and feelings of worthlessness), with prior ADHD diagnosis, therefore Bupropion 150mg PO daily was trialed for  reducing symptoms of both depression and ADHD. Upon follow up today on 9/15, patient appears euthymic, has been tolerating his buproprion well, and has not required any PRN medications. Patient is at an elevated acute and chronic suicide risk due to current depressive symptoms, impulsivity, hopelessness, recent psychosocial stressors, and prior SA. As patient is an acute risk of harm to self, patient requires inpatient hospitalization for safety and stabilization. Pt is aware and is agreeable with plan. Pt signed 9.13 - pending bed availability at [facility].    Impression: Unspecified mood disorder  R/o major depressive disorder  R/o borderline personality disorder     RECOMMENDATIONS:   1. Continue patient on 1:1.    2. Patient requires inpatient psychiatric admission - 9.13 signed.  -Pending bed availability at [facility]    3. Continue Bupropion 150mg PO daily     4. CL will continue to follow. 
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    On initial evaluation patient reported that he overdosed last night in order to die in the context of psychosocial stressors. Although he denied suicidal ideation, he has continued depression and he is no longer on psychiatric medications as he d/c Abilify and Lexapro this month. Pt also with some anhedonia and ongoing hopelessness. On today's assessment 9/13, patient was sitting in bed, calm and cooperative, depressed mood and affect, reality-based thought content and linear thought process. Patient is at an elevated acute and chronic suicide risk due to current depressive symptoms, impulsivity, hopelessness, recent psychosocial stressors, and prior SA. Due to suicide attempt and ongoing depression, patient requires inpatient hospitalization for safety and stabilization. Pt is aware and is agreeable with plan. Pt signed 9.13 today- pending bed availability at [facility].    Impression: Unspecified mood disorder  R/o bipolar disorder  R/o major depressive disorder  R/o borderline personality disorder     RECOMMENDATIONS:   1. Continue patient on 1:1.    2. Patient requires inpatient psychiatric admission - 9.13 signed.  -Pending bed availability at [facility]    3. CL will continue to follow.

## 2024-09-15 NOTE — BH CONSULTATION LIAISON PROGRESS NOTE - NSBHFUPINTERVALHXFT_PSY_A_CORE
Chart reviewed. No acute overnight events reported. No PRN medications administered.     Patient seen and evaluated at bedside. Encounter found patient to be laying comfortably in bed, no acute distress, 1:1 at bedside. Patient was found to be cooperative and calm during the encounter. This was the patient's second suicide attempt- his first one was in June 2023, also via overdosing on pills. At the time, patient went to [facility] and then later to a facility in [place] for a month. Patient was started on lexapro and abilify concurrently for mood symptoms at the time, but stopped around a month and a half ago. Patient states that he gained ~50 pounds since starting the medications, and recently lost 10. The patient reports normal mood for several weeks after stopping the medications. Patient says that he recently started work again, and had to call out sick on Wednesday as he wasn't feeling well, which made him feel like he wasn't capable of doing anything or working anymore, which led to him impulsively overdosing. Patient was disappointed to find himself still alive, as now he has to deal with his family's reactions to his suicide attempt. Patient endorses low mood and thoughts of not wanting to be alive anymore. Patient endorses episodes of irritability, but states they were in the context of being provoked. Patient also endorsed racing thoughts, but denied grandiosity and going nights without sleeping.     Patient signed voluntary forms for [facility] admission. Patient will be willing to start on a medication for mood that does not have weight gain as a side effect. 
Nursing reports no acute events overnight. Per chart review the patient has not required any behavioral PRNS since the last assessment.    Chart reviewed, patient seen and evaluated in AM. On approach, patient is talking with his mother's boyfriend who has come to visit him. The patient was interviewed in privacy, with just the 1:1 present. He appears euthymic, but states that his mood has been "flat." He notes feeling bored while waiting on the inpatient medical floor, but denies any acute mood decompensation or depression. He states he no longer has thoughts of suicide, and now regrets his attempt. He states that it has been good to see his many visitors. He denies any AE from the Buproprion which he took yesterday. States he is sleeping and eating well.      Patient denies any current SI. No HI/AVH elicited
Chart reviewed. No acute overnight events reported. No PRN medications administered.     Upon evaluation, patient was sleeping, 1:1 at bedside. Patient remains pleasant, cooperative, and linear. He explains that his last SI was a couple days ago. He currently has feeling of like a burden to his family; he acknowledges he has a support system, but "doesn't feel like I actually do." He has OK sleep and appetite. His transfer to the inpatient unit was discussed, questions were answered, and patient is still amenable for transfer.

## 2024-09-15 NOTE — BH CONSULTATION LIAISON PROGRESS NOTE - NSBHCHARTREVIEWLAB_PSY_A_CORE FT
LABS:                        15.8   6.99  )-----------( 238      ( 13 Sep 2024 06:57 )             47.8     09-13    141  |  103  |  9<L>  ----------------------------<  93  4.6   |  28  |  1.0    Ca    9.2      13 Sep 2024 06:57  Mg     2.1     09-13    TPro  6.7  /  Alb  4.4  /  TBili  0.6  /  DBili  x   /  AST  16  /  ALT  32  /  AlkPhos  81  09-13        
  LABS:                        15.8   6.99  )-----------( 238      ( 13 Sep 2024 06:57 )             47.8     09-13    141  |  103  |  9<L>  ----------------------------<  93  4.6   |  28  |  1.0    Ca    9.2      13 Sep 2024 06:57  Mg     2.1     09-13    TPro  6.7  /  Alb  4.4  /  TBili  0.6  /  DBili  x   /  AST  16  /  ALT  32  /  AlkPhos  81  09-13        
    Comprehensive Metabolic Panel (09.13.24 @ 06:57)   Sodium: 141 mmol/L  Potassium: 4.6 mmol/L  Chloride: 103 mmol/L  Carbon Dioxide: 28 mmol/L  Anion Gap: 10 mmol/L  Blood Urea Nitrogen: 9 mg/dL  Creatinine: 1.0 mg/dL  Glucose: 93 mg/dL  Calcium: 9.2 mg/dL  Protein Total: 6.7 g/dL  Albumin: 4.4 g/dL  Bilirubin Total: 0.6 mg/dL  Alkaline Phosphatase: 81 U/L  Aspartate Aminotransferase (AST/SGOT): 16 U/L  Alanine Aminotransferase (ALT/SGPT): 32 U/L  eGFR: 110: The estimated glomerular filtration rate (eGFR) calculation is based on   the 2021 CKD-EPI creatinine equation, which is validated in male and   female population 18 years of age and older (N Engl J Med 2021;   [id number]). mL/min/1.73m2
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
## 2024-09-15 NOTE — BH CONSULTATION LIAISON PROGRESS NOTE - NSBHCHARTREVIEWVS_PSY_A_CORE FT
Vital Signs Last 24 Hrs  T(C): 36.4 (13 Sep 2024 23:12), Max: 36.7 (13 Sep 2024 15:25)  T(F): 97.6 (13 Sep 2024 23:12), Max: 98.1 (13 Sep 2024 15:25)  HR: 60 (13 Sep 2024 23:12) (60 - 66)  BP: 112/80 (13 Sep 2024 23:12) (112/70 - 112/80)  BP(mean): --  RR: 18 (13 Sep 2024 23:12) (18 - 18)  SpO2: 96% (13 Sep 2024 23:12) (96% - 96%)    Parameters below as of 13 Sep 2024 23:12  Patient On (Oxygen Delivery Method): room air    
Vital Signs Last 24 Hrs  T(C): 36.3 (15 Sep 2024 07:00), Max: 36.3 (15 Sep 2024 07:00)  T(F): 97.3 (15 Sep 2024 07:00), Max: 97.3 (15 Sep 2024 07:00)  HR: 67 (15 Sep 2024 07:00) (67 - 93)  BP: 116/60 (15 Sep 2024 07:00) (116/60 - 122/84)  BP(mean): --  RR: 20 (15 Sep 2024 07:00) (18 - 21)  SpO2: 100% (15 Sep 2024 07:00) (100% - 100%)    Parameters below as of 15 Sep 2024 07:00  Patient On (Oxygen Delivery Method): room air    
Vital Signs Last 24 Hrs  T(C): 36.4 (13 Sep 2024 08:00), Max: 36.8 (12 Sep 2024 20:35)  T(F): 97.6 (13 Sep 2024 08:00), Max: 98.2 (12 Sep 2024 20:35)  HR: 62 (13 Sep 2024 08:00) (62 - 83)  BP: 113/73 (13 Sep 2024 08:00) (113/73 - 131/79)  BP(mean): --  RR: 18 (13 Sep 2024 08:00) (18 - 18)  SpO2: 97% (13 Sep 2024 08:00) (97% - 98%)    Parameters below as of 13 Sep 2024 08:00  Patient On (Oxygen Delivery Method): room air

## 2024-09-15 NOTE — BH CONSULTATION LIAISON PROGRESS NOTE - NSBHCHARTREVIEWINVESTIGATE_PSY_A_CORE FT
< from: 12 Lead ECG (09.12.24 @ 06:35) >    Ventricular Rate 59 BPM    Atrial Rate 59 BPM    P-R Interval 114 ms    QRS Duration 102 ms    Q-T Interval 434 ms    QTC Calculation(Bazett) 429 ms    P Axis 20 degrees    R Axis 28 degrees    T Axis 42 degrees    Diagnosis Line Sinus bradycardia  Nonspecific T wave abnormality  Abnormal ECG    Confirmed by Brayden Lee ([id number]) on 9/12/2024 3:00:37 PM    < end of copied text >    

## 2024-09-16 ENCOUNTER — TRANSCRIPTION ENCOUNTER (OUTPATIENT)
Age: 19
End: 2024-09-16

## 2024-09-16 ENCOUNTER — INPATIENT (INPATIENT)
Facility: HOSPITAL | Age: 19
LOS: 2 days | Discharge: HOME | DRG: 880 | End: 2024-09-19
Attending: PSYCHIATRY & NEUROLOGY | Admitting: PSYCHIATRY & NEUROLOGY
Payer: COMMERCIAL

## 2024-09-16 VITALS
DIASTOLIC BLOOD PRESSURE: 82 MMHG | SYSTOLIC BLOOD PRESSURE: 136 MMHG | HEART RATE: 80 BPM | RESPIRATION RATE: 18 BRPM | TEMPERATURE: 99 F | OXYGEN SATURATION: 100 %

## 2024-09-16 DIAGNOSIS — R45.851 SUICIDAL IDEATIONS: ICD-10-CM

## 2024-09-16 PROCEDURE — 84443 ASSAY THYROID STIM HORMONE: CPT

## 2024-09-16 PROCEDURE — 0241U: CPT

## 2024-09-16 PROCEDURE — 83036 HEMOGLOBIN GLYCOSYLATED A1C: CPT

## 2024-09-16 PROCEDURE — 99239 HOSP IP/OBS DSCHRG MGMT >30: CPT

## 2024-09-16 RX ORDER — DIPHENHYDRAMINE HCL 50 MG
50 CAPSULE ORAL EVERY 6 HOURS
Refills: 0 | Status: DISCONTINUED | OUTPATIENT
Start: 2024-09-16 | End: 2024-09-19

## 2024-09-16 RX ORDER — POLYETHYLENE GLYCOL 3350 17 G/17G
17 POWDER, FOR SOLUTION ORAL DAILY
Refills: 0 | Status: DISCONTINUED | OUTPATIENT
Start: 2024-09-16 | End: 2024-09-19

## 2024-09-16 RX ORDER — BUPROPION HYDROCHLORIDE 150 MG/1
150 TABLET ORAL DAILY
Refills: 0 | Status: DISCONTINUED | OUTPATIENT
Start: 2024-09-16 | End: 2024-09-19

## 2024-09-16 RX ORDER — BUPROPION HYDROCHLORIDE 150 MG/1
1 TABLET ORAL
Qty: 0 | Refills: 0 | DISCHARGE
Start: 2024-09-16

## 2024-09-16 RX ORDER — HALOPERIDOL 1 MG
5 TABLET ORAL EVERY 6 HOURS
Refills: 0 | Status: DISCONTINUED | OUTPATIENT
Start: 2024-09-16 | End: 2024-09-19

## 2024-09-16 RX ORDER — HYDROXYZINE HCL 25 MG
1 TABLET ORAL
Refills: 0 | DISCHARGE

## 2024-09-16 RX ORDER — SENNA 187 MG
2 TABLET ORAL AT BEDTIME
Refills: 0 | Status: DISCONTINUED | OUTPATIENT
Start: 2024-09-16 | End: 2024-09-19

## 2024-09-16 RX ORDER — LORAZEPAM 4 MG/ML
2 INJECTION INTRAMUSCULAR; INTRAVENOUS EVERY 6 HOURS
Refills: 0 | Status: DISCONTINUED | OUTPATIENT
Start: 2024-09-16 | End: 2024-09-19

## 2024-09-16 RX ADMIN — CHLORHEXIDINE GLUCONATE 1 APPLICATION(S): 40 SOLUTION TOPICAL at 05:16

## 2024-09-16 RX ADMIN — Medication 2 TABLET(S): at 21:06

## 2024-09-16 RX ADMIN — Medication 5 MILLIGRAM(S): at 21:06

## 2024-09-16 RX ADMIN — BUPROPION HYDROCHLORIDE 150 MILLIGRAM(S): 150 TABLET ORAL at 11:36

## 2024-09-16 RX ADMIN — POLYETHYLENE GLYCOL 3350 17 GRAM(S): 17 POWDER, FOR SOLUTION ORAL at 21:07

## 2024-09-16 NOTE — DISCHARGE NOTE NURSING/CASE MANAGEMENT/SOCIAL WORK - PATIENT PORTAL LINK FT
You can access the FollowMyHealth Patient Portal offered by [facility] by registering at the following website: [email or web address]. By joining [employer]’s FollowMyHealth portal, you will also be able to view your health information using other applications (apps) compatible with our system.

## 2024-09-16 NOTE — BH PATIENT PROFILE - FALL HARM RISK - UNIVERSAL INTERVENTIONS
Bed in lowest position, wheels locked, appropriate side rails in place/Call bell, personal items and telephone in reach/Instruct patient to call for assistance before getting out of bed or chair/Non-slip footwear when patient is out of bed/[place] to call system/Physically safe environment - no spills, clutter or unnecessary equipment/Purposeful Proactive Rounding/Room/bathroom lighting operational, light cord in reach

## 2024-09-16 NOTE — BH PATIENT PROFILE - FUNCTIONAL ASSESSMENT - DAILY ACTIVITY 5.
For Dr. Felix Oar: Patient's wife calling again regarding an alternative for colchicine (too expensive). Please advise. 4 = No assist / stand by assistance

## 2024-09-16 NOTE — BH PATIENT PROFILE - NSDYSPHAGRISKASSESS_PSY_ALL_CORE
Progress Notes by Guero Luke DO at 10/25/17 11:08 PM     Author:  Guero Luke DO Service:  (none) Author Type:  Physician     Filed:  10/25/17 11:10 PM Encounter Date:  10/25/2017 Status:  Signed     :  Guero Luke DO (Physician)            CC:   Chief Complaint     Patient presents with     • Musculoskeletal Problem      left ankle/foot injury today during gym        SUBJECTIVE:    Kennedy Atkins  is a 11 year old male who presents to Immediate Care with complaints of an injury to the[BH1.1T] left foot and[BH1.1M] ankle. This happened[BH1.1T] several hours[BH1.1M] ago. Pt states that the ankle rolled and now is painful. Associated symptoms include[BH1.1T] DIFFICULTY BENDING THE AFFECTED JOINT, DIFFICULTY WALKING, PAIN and SWELLING[BH1.1M]. Denies[BH1.1T] BLEEDING, BRUISING, NUMBNESS and WEAKNESS[BH1.1M]. Pt[BH1.1T] has[BH1.1M] been able to walk on it immediately after the injury. He[BH1.1T] has not[BH1.1M] had a serious injury to this area previously. The pain is[BH1.1T] moderate[BH1.1M] and[BH1.1T] constant and worse w/ walking/standing[BH1.1M].     Alleviating factors:[BH1.1T] rest[BH1.1M]  Worsening factors:[BH1.1T] walking/standing[BH1.1M]    Remainder of ROS is negative.     Patient Active Problem List    Diagnosis    • Closed nondisplaced fracture of shaft of left clavicle     Allergies: Cat hair extract    OBJECTIVE:    General appearance:[BH1.1T] Alert, well hydrated, well nourished in no apparent distress[BH1.1M]  Gait:[BH1.1T] normal[BH1.1M]    All lower extremithy findings were compared by also examining contralateral extremity    Ankle:   Soft-tissue swelling[BH1.1T] is not[BH1.1M] present  Tenderness[BH1.1T] is[BH1.1M] present over the lateral malleolus  Tenderness[BH1.1T] is[BH1.1M] present over the medial malleolus  Ankle joint effusion[BH1.1T] is not[BH1.1M] present  Ankle ecchymosis[BH1.1T] is not[BH1.1M] present  Excessive ligamentous laxity of the ankle[BH1.1T] is not[BH1.1M]  present.   Increased warmth of skin overlying joint[BH1.1T] is not[BH1.1M] present  Ankle range of motion:    * Dorsiflexion -[BH1.1T] normal[BH1.1M]   * Plantarflexion -[BH1.1T] normal[BH1.1M]   * Inversion -[BH1.1T] normal[BH1.1M]   * Eversion -[BH1.1T] normal[BH1.1M]  Strength:    * Anterior Tibial[BH1.1T] 5[BH1.1M]/5   * Posterior TIbial[BH1.1T] 5[BH1.1M]/5   * Gastrosoleus[BH1.1T] 5[BH1.1M]/5     * Peroneal[BH1.1T] 5[BH1.1M]/5   * Inversion[BH1.1T] 5[BH1.1M]/5   * Eversion[BH1.1T] 5[BH1.1M]/5  Talar tilt (inversion stress) test: negative  Anterior drawer test: negative    Foot:  Tenderness[BH1.1T] is not[BH1.1M] present over the superior/anterior talus  Tenderness[BH1.1T] is not[BH1.1M] present over the cuboid.   Tenderness[BH1.1T] is not[BH1.1M] present over the navicular.    Tenderness[BH1.1T] is not[BH1.1M] present over the cuneiforms.   Tenderness[BH1.1T] is not present over the metatarsal(s)[BH1.1M]  Tenderness[BH1.1T] is not present over the toe(s).[BH1.1M]  Edema[BH1.1T] is not[BH1.1M] present.   Ecchymosis[BH1.1T] is not[BH1.1M] present.   Increased foot warmth[BH1.1T] is not[BH1.1M] present.    Leg:  Distal calf ttp: negative  Proximal calf ttp: negative    Neuro: non-focal exam  Vascular: capillary refill less than 2 seconds    Imaging:[BH1.1T] No fracture (preliminary findings)[BH1.1M]    ASSESSMENT/PLAN:[BH1.1T]    Right foot and[BH1.1M] ankle sprain - Advised to use tylenol as directed prn pain.  Ice tid for 15 minutes at a time until swelling and pain resolved. Keep affected extremity elevated when not in use.  If symptoms improving in 4-5 days encouraged to actively exercise against light resistance and increase resistance as tolerated. Ankle air brace[BH1.1T] is[BH1.1M] provided today. Crutches[BH1.1T] are not[BH1.1M] provided today.  Follow-up with[BH1.1T] podiatry[BH1.1M] if worsening or not improving in next 5-7 days.    Diagnosis and prognosis, treatment and side-effects were explained and  all questions were answered satisfactorily and there were no further questions upon discharge    Electronically Signed by:    Guero Luke DO , 10/25/2017[BH1.1T]        Revision History        User Key Date/Time User Provider Type Action    > BH1.1 10/25/17 11:10 PM Guero Luke DO Physician Sign    M - Manual, T - Template             Yes, patient not at risk

## 2024-09-16 NOTE — DISCHARGE NOTE NURSING/CASE MANAGEMENT/SOCIAL WORK - NSDCPEFALRISK_GEN_ALL_CORE
For information on Fall & Injury Prevention, visit: [email or web address] OR  [email or web address] OR  https://www.cdc.gov/steadi/patient.html

## 2024-09-16 NOTE — PROGRESS NOTE ADULT - SUBJECTIVE AND OBJECTIVE BOX
MACI FARIAS  19y  Male      Patient is a 19y old male who presents with a chief complaint of Suicide attempt (13 Sep 2024 13:51)      INTERVAL HPI/OVERNIGHT EVENTS:  Patient seen and examined earlier this morning  lying in bed  in nad  on 1:1 sit  no complaints  on room air      T(C): 36.4 (09-13-24 @ 23:12), Max: 36.7 (09-13-24 @ 15:25)  HR: 60 (09-13-24 @ 23:12) (60 - 66)  BP: 112/80 (09-13-24 @ 23:12) (112/70 - 112/80)  RR: 18 (09-13-24 @ 23:12) (18 - 18)  SpO2: 96% (09-13-24 @ 23:12) (96% - 96%) on ra    PHYSICAL EXAM:  GENERAL: NAD, well-groomed,   HEAD:  Atraumatic, Normocephalic  EYES: conjunctiva and sclera clear  ENMT: Moist mucous membranes,  No visible lesions  NECK: Supple, No JVD, Normal thyroid  NERVOUS SYSTEM:  Alert & Oriented X3, Good concentration; moves all extremities   CHEST/LUNG: good air entry   HEART: Regular rate and rhythm; No murmurs, rubs, or gallops  ABDOMEN: Soft, Nontender, Nondistended; Bowel sounds present  EXTREMITIES:  2+ Peripheral Pulses, No clubbing, cyanosis, or edema  LYMPH: No lymphadenopathy noted  SKIN: No rashes or lesions    Consultant(s) Notes Reviewed:  [x ] YES  [ ] NO  Care Discussed with Consultants/Other Providers [ x] YES  [ ] NO    LAB:                        15.8   6.99  )-----------( 238      ( 13 Sep 2024 06:57 )             47.8     09-13    141  |  103  |  9<L>  ----------------------------<  93  4.6   |  28  |  1.0    Ca    9.2      13 Sep 2024 06:57  Mg     2.1     09-13    TPro  6.7  /  Alb  4.4  /  TBili  0.6  /  DBili  x   /  AST  16  /  ALT  32  /  AlkPhos  81  09-13    LIVER FUNCTIONS - ( 13 Sep 2024 06:57 )  Alb: 4.4 g/dL / Pro: 6.7 g/dL / ALK PHOS: 81 U/L / ALT: 32 U/L / AST: 16 U/L / GGT: x               Drug Dosing Weight  Height (cm): 193 (11 Sep 2024 23:14)  Weight (kg): 108.9 (11 Sep 2024 23:14)  BMI (kg/m2): 29.2 (11 Sep 2024 23:14)  BSA (m2): 2.39 (11 Sep 2024 23:14)      I&O's Summary    13 Sep 2024 07:01  -  14 Sep 2024 07:00  --------------------------------------------------------  IN: 590 mL / OUT: 0 mL / NET: 590 mL      Urinalysis Basic - ( 13 Sep 2024 06:57 )    Color: x / Appearance: x / SG: x / pH: x  Gluc: 93 mg/dL / Ketone: x  / Bili: x / Urobili: x   Blood: x / Protein: x / Nitrite: x   Leuk Esterase: x / RBC: x / WBC x   Sq Epi: x / Non Sq Epi: x / Bacteria: x        RADIOLOGY & ADDITIONAL TESTS:  Imaging Personally Reviewed:  [x] YES  [ ] NO    HEALTH ISSUES - PROBLEM Dx:  Hydroxyzine overdose    Depression, unspecified depression type            MEDS:  acetaminophen     Tablet .. 650 milliGRAM(s) Oral every 6 hours PRN  aluminum hydroxide/magnesium hydroxide/simethicone Suspension 30 milliLiter(s) Oral every 4 hours PRN  buPROPion XL (24-Hour) . 150 milliGRAM(s) Oral daily  chlorhexidine 2% Cloths 1 Application(s) Topical <User Schedule>  melatonin 3 milliGRAM(s) Oral at bedtime PRN      
  MACI FARIAS  19y  Male      Patient is a 19y old male who presents with a chief complaint of Suicide attempt (13 Sep 2024 13:51)      INTERVAL HPI/OVERNIGHT EVENTS:  Patient seen and examined earlier this morning  lying in bed  in nad  on 1:1 sit  no complaints  on room air  await IPP bed    Vital Signs Last 24 Hrs  T(C): 36.3 (15 Sep 2024 07:00), Max: 36.3 (15 Sep 2024 07:00)  T(F): 97.3 (15 Sep 2024 07:00), Max: 97.3 (15 Sep 2024 07:00)  HR: 67 (15 Sep 2024 07:00) (67 - 93)  BP: 116/60 (15 Sep 2024 07:00) (116/60 - 122/84)  BP(mean): --  RR: 20 (15 Sep 2024 07:00) (18 - 21)  SpO2: 100% (15 Sep 2024 07:00) (100% - 100%)    Parameters below as of 15 Sep 2024 07:00  Patient On (Oxygen Delivery Method): room air      PHYSICAL EXAM:  GENERAL: NAD, well-groomed,   HEAD:  Atraumatic, Normocephalic  EYES: conjunctiva and sclera clear  ENMT: Moist mucous membranes,  No visible lesions  NECK: Supple, No JVD, Normal thyroid  NERVOUS SYSTEM:  Alert & Oriented X3, Good concentration; moves all extremities   CHEST/LUNG: good air entry   HEART: Regular rate and rhythm; No murmurs, rubs, or gallops  ABDOMEN: Soft, Nontender, Nondistended; Bowel sounds present  EXTREMITIES:  2+ Peripheral Pulses, No clubbing, cyanosis, or edema  LYMPH: No lymphadenopathy noted  SKIN: No rashes or lesions    Consultant(s) Notes Reviewed:  [x ] YES  [ ] NO  Care Discussed with Consultants/Other Providers [ x] YES  [ ] NO    LAB:                        15.8   6.99  )-----------( 238      ( 13 Sep 2024 06:57 )             47.8     09-13    141  |  103  |  9<L>  ----------------------------<  93  4.6   |  28  |  1.0    Ca    9.2      13 Sep 2024 06:57  Mg     2.1     09-13    TPro  6.7  /  Alb  4.4  /  TBili  0.6  /  DBili  x   /  AST  16  /  ALT  32  /  AlkPhos  81  09-13    LIVER FUNCTIONS - ( 13 Sep 2024 06:57 )  Alb: 4.4 g/dL / Pro: 6.7 g/dL / ALK PHOS: 81 U/L / ALT: 32 U/L / AST: 16 U/L / GGT: x               Drug Dosing Weight  Height (cm): 193 (11 Sep 2024 23:14)  Weight (kg): 108.9 (11 Sep 2024 23:14)  BMI (kg/m2): 29.2 (11 Sep 2024 23:14)  BSA (m2): 2.39 (11 Sep 2024 23:14)      Urinalysis Basic - ( 13 Sep 2024 06:57 )    Color: x / Appearance: x / SG: x / pH: x  Gluc: 93 mg/dL / Ketone: x  / Bili: x / Urobili: x   Blood: x / Protein: x / Nitrite: x   Leuk Esterase: x / RBC: x / WBC x   Sq Epi: x / Non Sq Epi: x / Bacteria: x        RADIOLOGY & ADDITIONAL TESTS:  Imaging Personally Reviewed:  [x] YES  [ ] NO    HEALTH ISSUES - PROBLEM Dx:  Hydroxyzine overdose    Depression, unspecified depression type            MEDS:  acetaminophen     Tablet .. 650 milliGRAM(s) Oral every 6 hours PRN  aluminum hydroxide/magnesium hydroxide/simethicone Suspension 30 milliLiter(s) Oral every 4 hours PRN  buPROPion XL (24-Hour) . 150 milliGRAM(s) Oral daily  chlorhexidine 2% Cloths 1 Application(s) Topical <User Schedule>  melatonin 3 milliGRAM(s) Oral at bedtime PRN      
SUBJECTIVE/OVERNIGHT EVENTS  Today is hospital day 1d. This morning patient was seen and examined at bedside, resting comfortably in bed. No acute or major events overnight.    MEDICATIONS  STANDING MEDICATIONS  chlorhexidine 2% Cloths 1 Application(s) Topical <User Schedule>    PRN MEDICATIONS  acetaminophen     Tablet .. 650 milliGRAM(s) Oral every 6 hours PRN  aluminum hydroxide/magnesium hydroxide/simethicone Suspension 30 milliLiter(s) Oral every 4 hours PRN  melatonin 3 milliGRAM(s) Oral at bedtime PRN    VITALS  T(F): 97.6 (09-13-24 @ 08:00), Max: 98.2 (09-12-24 @ 20:35)  HR: 62 (09-13-24 @ 08:00) (62 - 83)  BP: 113/73 (09-13-24 @ 08:00) (113/73 - 131/79)  RR: 18 (09-13-24 @ 08:00) (18 - 18)  SpO2: 97% (09-13-24 @ 08:00) (97% - 98%)    PHYSICAL EXAM  GENERAL  ( x ) NAD, lying in bed comfortably     (  ) obtunded     (  ) lethargic     (  ) somnolent    HEAD  (  ) Atraumatic     (  ) hematoma     (  ) laceration (specify location:       )     NECK  (  ) Supple     (  ) neck stiffness     (  ) nuchal rigidity     (  )  no JVD     (  ) JVD present ( -- cm)    HEART  Rate -->  (  x) normal rate    (  ) bradycardic    (  ) tachycardic  Rhythm -->  (  ) regular    (  ) regularly irregular    (  ) irregularly irregular  Murmurs -->  (  ) normal s1/s2    (  ) systolic murmur    (  ) diastolic murmur    (  ) continuous murmur     (  ) S3 present    (  ) S4 present    LUNGS  (  x)Unlabored respirations     (  ) tachypnea  (  x) B/L air entry     (  ) decreased breath sounds in:  (location     )    (  ) no adventitious sound     (  ) crackles     (  ) wheezing      (  ) rhonchi      (specify location:       )  (  ) chest wall tenderness (specify location:       )    ABDOMEN  ( x ) Soft     (  ) tense   |   (  x) nondistended     (  ) distended   |   (  ) +BS     (  ) hypoactive bowel sounds     (  ) hyperactive bowel sounds  ( x ) nontender     (  ) RUQ tenderness     (  ) RLQ tenderness     (  ) LLQ tenderness     (  ) epigastric tenderness     (  ) diffuse tenderness  (  ) Splenomegaly      (  ) Hepatomegaly      (  ) Jaundice     (  ) ecchymosis     EXTREMITIES  (  ) Normal     (  ) Rash     (  ) ecchymosis     (  ) varicose veins      (  ) pitting edema     (  ) non-pitting edema   (  ) ulceration     (  ) gangrene:     (location:     )    NERVOUS SYSTEM  (x  ) A&Ox3     (  ) confused     (  ) lethargic  CN II-XII:     (  ) Intact     (  ) focal deficits  (Specify:     )   Upper extremities:     (  ) strength X/5     (  ) focal deficit (specify:    )  Lower extremities:     (  ) strength  X/5    (  ) focal deficit (specify:    )    SKIN  (  ) No rashes or lesions     (  ) maculopapular rash     (  ) pustules     (  ) vesicles     (  ) ulcer     (  ) ecchymosis     (specify location:     )    (  ) Indwelling Estrella Catheter   Date insterted:    Reason (  ) Critical illness     (  ) urinary retention    (  ) Accurate Ins/Outs Monitoring     (  ) CMO patient    (  ) Central Line  Date inserted:  Location: (  ) Right IJ   (  ) Left IJ   (  ) Right Fem   (  ) Left Fem    (  ) SPC  (  ) pigtail  (  ) PEG tube  (  ) colostomy  (  ) jejunostomy  (  ) U-Dall    LABS             15.8   6.99  )-----------( 238      ( 09-13-24 @ 06:57 )             47.8     141  |  103  |  9   -------------------------<  93   09-13-24 @ 06:57  4.6  |  28  |  1.0    Ca      9.2     09-13-24 @ 06:57  Mg     2.1     09-13-24 @ 06:57    TPro  6.7  /  Alb  4.4  /  TBili  0.6  /  DBili  x   /  AST  16  /  ALT  32  /  AlkPhos  81  /  GGT  x     09-13-24 @ 06:57        Urinalysis Basic - ( 13 Sep 2024 06:57 )    Color: x / Appearance: x / SG: x / pH: x  Gluc: 93 mg/dL / Ketone: x  / Bili: x / Urobili: x   Blood: x / Protein: x / Nitrite: x   Leuk Esterase: x / RBC: x / WBC x   Sq Epi: x / Non Sq Epi: x / Bacteria: x          IMAGING
SUBJECTIVE/OVERNIGHT EVENTS  Today is hospital day 4d. This morning patient was seen and examined at bedside, resting comfortably in bed. No acute or major events overnight.    MEDICATIONS  STANDING MEDICATIONS  buPROPion XL (24-Hour) . 150 milliGRAM(s) Oral daily  chlorhexidine 2% Cloths 1 Application(s) Topical <User Schedule>    PRN MEDICATIONS  acetaminophen     Tablet .. 650 milliGRAM(s) Oral every 6 hours PRN  aluminum hydroxide/magnesium hydroxide/simethicone Suspension 30 milliLiter(s) Oral every 4 hours PRN  melatonin 3 milliGRAM(s) Oral at bedtime PRN    VITALS  T(F): 97.7 (09-15-24 @ 23:37), Max: 98.7 (09-15-24 @ 15:00)  HR: 84 (09-15-24 @ 23:37) (84 - 91)  BP: 121/77 (09-15-24 @ 23:37) (121/77 - 126/79)  RR: 18 (09-15-24 @ 23:37) (18 - 18)  SpO2: 100% (09-15-24 @ 23:37) (100% - 100%)    PHYSICAL EXAM  GENERAL  ( x ) NAD, lying in bed comfortably     (  ) obtunded     (  ) lethargic     (  ) somnolent    HEAD  (  ) Atraumatic     (  ) hematoma     (  ) laceration (specify location:       )     NECK  (  ) Supple     (  ) neck stiffness     (  ) nuchal rigidity     (  )  no JVD     (  ) JVD present ( -- cm)    HEART  Rate -->  (  ) normal rate    (  ) bradycardic    (  ) tachycardic  Rhythm -->  (  ) regular    (  ) regularly irregular    (  ) irregularly irregular  Murmurs -->  (  ) normal s1/s2    (  ) systolic murmur    (  ) diastolic murmur    (  ) continuous murmur     (  ) S3 present    (  ) S4 present    LUNGS  (  )Unlabored respirations     (  ) tachypnea  (  ) B/L air entry     (  ) decreased breath sounds in:  (location     )    (  ) no adventitious sound     (  ) crackles     (  ) wheezing      (  ) rhonchi      (specify location:       )  (  ) chest wall tenderness (specify location:       )    ABDOMEN  (  ) Soft     (  ) tense   |   (  ) nondistended     (  ) distended   |   (  ) +BS     (  ) hypoactive bowel sounds     (  ) hyperactive bowel sounds  (  ) nontender     (  ) RUQ tenderness     (  ) RLQ tenderness     (  ) LLQ tenderness     (  ) epigastric tenderness     (  ) diffuse tenderness  (  ) Splenomegaly      (  ) Hepatomegaly      (  ) Jaundice     (  ) ecchymosis     EXTREMITIES  (  ) Normal     (  ) Rash     (  ) ecchymosis     (  ) varicose veins      (  ) pitting edema     (  ) non-pitting edema   (  ) ulceration     (  ) gangrene:     (location:     )    NERVOUS SYSTEM  (  ) A&Ox3     (  ) confused     (  ) lethargic  CN II-XII:     (  ) Intact     (  ) focal deficits  (Specify:     )   Upper extremities:     (  ) strength X/5     (  ) focal deficit (specify:    )  Lower extremities:     (  ) strength  X/5    (  ) focal deficit (specify:    )    SKIN  (  ) No rashes or lesions     (  ) maculopapular rash     (  ) pustules     (  ) vesicles     (  ) ulcer     (  ) ecchymosis     (specify location:     )    (  ) Indwelling Estrella Catheter   Date insterted:    Reason (  ) Critical illness     (  ) urinary retention    (  ) Accurate Ins/Outs Monitoring     (  ) CMO patient    (  ) Central Line  Date inserted:  Location: (  ) Right IJ   (  ) Left IJ   (  ) Right Fem   (  ) Left Fem    (  ) SPC  (  ) pigtail  (  ) PEG tube  (  ) colostomy  (  ) jejunostomy  (  ) U-Dall

## 2024-09-17 LAB
A1C WITH ESTIMATED AVERAGE GLUCOSE RESULT: 5.8 % — HIGH (ref 4–5.6)
ESTIMATED AVERAGE GLUCOSE: 120 MG/DL — HIGH (ref 68–114)
FLUAV AG NPH QL: SIGNIFICANT CHANGE UP
FLUBV AG NPH QL: SIGNIFICANT CHANGE UP
RSV RNA NPH QL NAA+NON-PROBE: SIGNIFICANT CHANGE UP
SARS-COV-2 RNA SPEC QL NAA+PROBE: DETECTED
TSH SERPL-MCNC: 2.9 UIU/ML — SIGNIFICANT CHANGE UP (ref 0.27–4.2)

## 2024-09-17 PROCEDURE — 99232 SBSQ HOSP IP/OBS MODERATE 35: CPT

## 2024-09-17 PROCEDURE — 99221 1ST HOSP IP/OBS SF/LOW 40: CPT

## 2024-09-17 RX ADMIN — Medication 50 MILLIGRAM(S): at 20:48

## 2024-09-17 RX ADMIN — POLYETHYLENE GLYCOL 3350 17 GRAM(S): 17 POWDER, FOR SOLUTION ORAL at 08:46

## 2024-09-17 RX ADMIN — LORAZEPAM 2 MILLIGRAM(S): 4 INJECTION INTRAMUSCULAR; INTRAVENOUS at 20:47

## 2024-09-17 RX ADMIN — BUPROPION HYDROCHLORIDE 150 MILLIGRAM(S): 150 TABLET ORAL at 08:46

## 2024-09-17 RX ADMIN — Medication 2 TABLET(S): at 20:45

## 2024-09-17 RX ADMIN — Medication 5 MILLIGRAM(S): at 20:45

## 2024-09-17 RX ADMIN — Medication 5 MILLIGRAM(S): at 20:47

## 2024-09-17 NOTE — BH INPATIENT PSYCHIATRY ASSESSMENT NOTE - CURRENT MEDICATION
MEDICATIONS  (STANDING):  buPROPion XL (24-Hour) 150 milliGRAM(s) Oral daily  melatonin. 5 milliGRAM(s) Oral at bedtime  polyethylene glycol 3350 17 Gram(s) Oral daily  senna 2 Tablet(s) Oral at bedtime    MEDICATIONS  (PRN):  diphenhydrAMINE 50 milliGRAM(s) Oral every 6 hours PRN agitation  haloperidol     Tablet 5 milliGRAM(s) Oral every 6 hours PRN agitation  LORazepam     Tablet 2 milliGRAM(s) Oral every 6 hours PRN agitation

## 2024-09-17 NOTE — CONSULT NOTE ADULT - SUBJECTIVE AND OBJECTIVE BOX
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     Pt seen and chart reviewed. Pt is calm and cooperative, but somewhat sedated s/p overdose on Hydroxyzine. He states that last night at 9:30pm he tried to kill himself "with a lot of pills of hydroxyzine." He states that he took 18 pills (50mg) in order to die. He stated that he researched the amount of medication needed to die only after he took the pills. Pt states he has done this in the past with different medications. He states that he has frequently wished hew as not alive. Yesterday, he was talking to his brother about life, and said that life is unbearable and it "spiraled" and he decided impulsively to take the overdose. He states that he still feels really sad. He stated that he just started work 2 days ago, after taking a long break. States he felt pressure to return to work and can "barely go to work." He reports good sleep and normal appetite. Denies NSSIB. States that right now he does not have suicidal ideation but he feels very tired. He reports that he has a GF and close friends, but has not been seeing them much recently. He went to [place] this summer with them and it was not as fun as he thought it would be. States that his actions were an "accumulation of stuff." He sees a psychiatrist Dr. Montenegro on [facility] and therapist Vicki. Was taking Abilify, Lexapro, and Vistaril, but a month ago went off Abilify and Lexapro due to concern for weight gain and now is only on Vistaril. Denies ETOH/Weed/illicit substance use. No access to guns. No auditory/visual hallucinations. No overt paranoia. No signs of symptoms of tay. He is not RIS. He denies homicidal ideation. Has never been in a fight and has never been violent.   Collateral received from patient's mother by MSW with patient's permission. See  note for more details.    Covid positive   no symptoms   no medical complaints     FAMILY HISTORY:  No pertinent family history in first degree relatives      PAST MEDICAL & SURGICAL HISTORY:  Asthma      No significant past surgical history          SOCIAL HISTORY  Social History:      Home Medications:  buPROPion 150 mg/24 hours (XL) oral tablet, extended release: 1 tab(s) orally once a day (16 Sep 2024 10:12)      ROS  General: Denies fevers, chills, nightsweats, weight loss  HEENT: Denies headache, rhinorrhea, sore throat, eye pain  CV: Denies CP, palpitations  PULM: Denies SOB, cough  GI: Denies abdominal pain, diarrhea  : Denies dysuria, hematuria  MSK: Denies arthralgias  SKIN: Denies rash   NEURO: Denies paresthesias, weakness  PSYCH: Denies depression    VITALS:  T(F): 96.1, Max: 96.1 (09-17-24 @ 08:45)  HR: 74  BP: 145/79  RR: 18Vital Signs Last 24 Hrs  T(C): 35.6 (17 Sep 2024 08:45), Max: 35.6 (17 Sep 2024 08:45)  T(F): 96.1 (17 Sep 2024 08:45), Max: 96.1 (17 Sep 2024 08:45)  HR: 74 (17 Sep 2024 08:45) (74 - 74)  BP: 145/79 (17 Sep 2024 08:45) (145/79 - 145/79)  BP(mean): --  RR: 18 (17 Sep 2024 08:45) (18 - 18)  SpO2: --        PHYSICAL EXAM:  Gen: NAD, resting in bed  HEENT: Normocephalic, atraumatic  Neck: supple, no lymphadenopathy  CV: Regular rate & regular rhythm  Lungs: CTABL no wheeze  Abdomen: Soft, NTND+ BS present  Ext: Warm, well perfused no CCE  Neuro: non focal, awake, CN II-XII intact   Skin: no rash, no erythema  Psych: no SI, HI, Hallucination     TESTS & MEASUREMENTS:                  COVID-19 PCR: Detected (11 Sep 2024 23:55)      QRS axis to [] ° and NSR at a rate of [] BPM. There was no atrial enlargement. There was no ventricular hypertrophy. There were no ST-T changes and all intervals were normal.      INFECTIOUS DISEASES TESTING      RADIOLOGY & ADDITIONAL TESTS:  I have personally reviewed the last Chest xray  CXR      CT      CARDIOLOGY TESTING  12 Lead ECG:   Ventricular Rate 68 BPM    Atrial Rate 68 BPM    P-R Interval 132 ms    QRS Duration 98 ms    Q-T Interval 380 ms    QTC Calculation(Bazett) 404 ms    P Axis 58 degrees    R Axis 43 degrees    T Axis 52 degrees    Diagnosis Line Normal sinus rhythm  Normal ECG    Confirmed by Brayden Lee ([id number]) on 9/16/2024 9:34:53 PM (09-15-24 @ 13:20)  12 Lead ECG:   Ventricular Rate 59 BPM    Atrial Rate 59 BPM    P-R Interval 114 ms    QRS Duration 102 ms    Q-T Interval 434 ms    QTC Calculation(Bazett) 429 ms    P Axis 20 degrees    R Axis 28 degrees    T Axis 42 degrees    Diagnosis Line Sinus bradycardia  Nonspecific T wave abnormality  Abnormal ECG    Confirmed by Brayden Lee ([id number]) on 9/12/2024 3:00:37 PM (09-12-24 @ 06:35)      MEDICATIONS  (STANDING):  buPROPion XL (24-Hour) 150 milliGRAM(s) Oral daily  melatonin. 5 milliGRAM(s) Oral at bedtime  polyethylene glycol 3350 17 Gram(s) Oral daily  senna 2 Tablet(s) Oral at bedtime    MEDICATIONS  (PRN):  diphenhydrAMINE 50 milliGRAM(s) Oral every 6 hours PRN agitation  haloperidol     Tablet 5 milliGRAM(s) Oral every 6 hours PRN agitation  LORazepam     Tablet 2 milliGRAM(s) Oral every 6 hours PRN agitation      ANTIBIOTICS:      All available historical data has been reviewed    ASSESSMENT  19y M admitted with Suicidal ideation  management per psych   asthma well controlled   asymptomatic covid monitor for symptoms   Please call with questions

## 2024-09-17 NOTE — BH INPATIENT PSYCHIATRY ASSESSMENT NOTE - NSBHASSESSSUMMFT_PSY_ALL_CORE
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    On initial evaluation patient reported that he overdosed last night in order to die in the context of psychosocial stressors. Although he denied suicidal ideation, he has continued depression and he is no longer on psychiatric medications as he d/c Abilify and Lexapro this month. Pt also with some anhedonia and ongoing hopelessness. On today's assessment 9/13, patient was sitting in bed, calm and cooperative, depressed mood and affect, reality-based thought content and linear thought process. Patient is at an elevated acute and chronic suicide risk due to current depressive symptoms, impulsivity, hopelessness, recent psychosocial stressors, and prior SA. Due to suicide attempt and ongoing depression, patient requires inpatient hospitalization for safety and stabilization. Pt is aware and is agreeable with plan. Pt signed 9.13 today- pending bed availability at [facility].    Impression: Unspecified mood disorder  R/o bipolar disorder  R/o major depressive disorder  R/o borderline personality disorder     RECOMMENDATIONS:   1. Continue patient on 1:1.    2. Patient requires inpatient psychiatric admission - 9.13 signed.  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 Pt reported that impulsively overdosed in the context of psychosocial stressors. Although he denied suicidal ideation, he has continued depression and he is no longer on psychiatric medications as he d/c Abilify and Lexapro this month. Pt also with some anhedonia and ongoing hopelessness. Patient is at an elevated acute and chronic suicide risk due to current depressive symptoms, impulsivity, hopelessness, recent psychosocial stressors, and prior SA. His presentation appears most consistent with MDD, r/o personality d/o. Pt will likely benefit from IPP at this time.    #MDD  -c/w wellbutrin xl 150 mg daily    #Agitation  -for agitation not amenable to verbal redirection, may give haldol 5 mg q6h prn and ativan 2 mg q6h prn with escalation to IM if pt is an acute danger to self or/and others with repeat EKG to ensure QTc <500 ms

## 2024-09-17 NOTE — BH INPATIENT PSYCHIATRY ASSESSMENT NOTE - NSBHMETABOLIC_PSY_ALL_CORE_FT
BMI: BMI (kg/m2): 29.2 (09-11-24 @ 23:14)  HbA1c:   Glucose:   BP: 145/79 (09-17-24 @ 08:45) (136/82 - 145/79)Vital Signs Last 24 Hrs  T(C): 35.6 (09-17-24 @ 08:45), Max: 37.1 (09-16-24 @ 16:13)  T(F): 96.1 (09-17-24 @ 08:45), Max: 98.8 (09-16-24 @ 16:13)  HR: 74 (09-17-24 @ 08:45) (74 - 80)  BP: 145/79 (09-17-24 @ 08:45) (136/82 - 145/79)  BP(mean): --  RR: 18 (09-17-24 @ 08:45) (18 - 18)  SpO2: 100% (09-16-24 @ 16:13) (100% - 100%)      Lipid Panel:

## 2024-09-17 NOTE — BH INPATIENT PSYCHIATRY ASSESSMENT NOTE - HPI (INCLUDE ILLNESS QUALITY, SEVERITY, DURATION, TIMING, CONTEXT, MODIFYING FACTORS, ASSOCIATED SIGNS AND SYMPTOMS)
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     Pt seen and chart reviewed. Pt is calm and cooperative, but somewhat sedated s/p overdose on Hydroxyzine. He states that last night at 9:30pm he tried to kill himself "with a lot of pills of hydroxyzine." He states that he took 18 pills (50mg) in order to die. He stated that he researched the amount of medication needed to die only after he took the pills. Pt states he has done this in the past with different medications. He states that he has frequently wished hew as not alive. Yesterday, he was talking to his brother about life, and said that life is unbearable and it "spiraled" and he decided impulsively to take the overdose. He states that he still feels really sad. He stated that he just started work 2 days ago, after taking a long break. States he felt pressure to return to work and can "barely go to work." He reports good sleep and normal appetite. Denies NSSIB. States that right now he does not have suicidal ideation but he feels very tired. He reports that he has a GF and close friends, but has not been seeing them much recently. He went to [place] this summer with them and it was not as fun as he thought it would be. States that his actions were an "accumulation of stuff." He sees a psychiatrist Dr. Montenegro on [facility] and therapist Vicki. Was taking Abilify, Lexapro, and Vistaril, but a month ago went off Abilify and Lexapro due to concern for weight gain and now is only on Vistaril. Denies ETOH/Weed/illicit substance use. No access to guns. No auditory/visual hallucinations. No overt paranoia. No signs of symptoms of tay. He is not RIS. He denies homicidal ideation. Has never been in a fight and has never been violent.   Collateral received from patient's mother by MSW with patient's permission. See  note for more details.    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     Pt seen and chart reviewed. Pt is calm and cooperative, but somewhat sedated s/p overdose on Hydroxyzine. He states that last night at 9:30pm he tried to kill himself "with a lot of pills of hydroxyzine." He states that he took 18 pills (50mg) in order to die. He stated that he researched the amount of medication needed to die only after he took the pills. Pt states he has done this in the past with different medications. He states that he has frequently wished hew as not alive. Yesterday, he was talking to his brother about life, and said that life is unbearable and it "spiraled" and he decided impulsively to take the overdose. He states that he still feels really sad. He stated that he just started work 2 days ago, after taking a long break. States he felt pressure to return to work and can "barely go to work." He reports good sleep and normal appetite. Denies NSSIB. States that right now he does not have suicidal ideation but he feels very tired. He reports that he has a GF and close friends, but has not been seeing them much recently. He went to [place] this summer with them and it was not as fun as he thought it would be. States that his actions were an "accumulation of stuff." He sees a psychiatrist Dr. Montenegro on [facility] and therapist Vicki. Was taking Abilify, Lexapro, and Vistaril, but a month ago went off Abilify and Lexapro due to concern for weight gain and now is only on Vistaril. Denies ETOH/Weed/illicit substance use. No access to guns. No auditory/visual hallucinations. No overt paranoia. No signs of symptoms of tay. He is not RIS. He denies homicidal ideation. Has never been in a fight and has never been violent.   Collateral received from patient's mother by MSW with patient's permission. See  note for more details.    On evaluation on IPP, pt presents linear and cooperative, well-groomed with fair eye contact. He states he came to the hospital after he impulsively took 18 pills of hydroxyzine. 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 He states he last worked at the job during Covid and he had to transport many patients to the [facility]. He states on day of OD, he had called out of his job since he was not feeling well. He states he then took 3 handfuls of 6 pills of atarax consecutively. He states he self-stopped from taking more pills because he was both taking a video of himself taking the pills as he was on a phone call with his girlfriend. He reports ingestion was impulsive, denies he was with suicidal bx prior to ingestion. He reports his relationships with his friends, family and girlfriend are fair with no recent conflicts. He admits to prior suicide attempt last year after interpersonal conflicts with ex-girlfriend who said he's a bum if he calls out of work. He reports ambivalence on being alive, then states he is feeling better because his girlfriend has been very supportive and he is thinking of starting kickboxing on discharge. He reports he has been sleeping and eating well. He reports difficulties concentrating, anergia and amotivation. He denies hx suggestive of tay or psychosis. He denies current active SI/HI, intent and plan.

## 2024-09-17 NOTE — UM REPORT PROGRESS NOTE - NSUMRMPROVIDER_GEN_A_CORE FT
Naeem Fierro   2005  ID [id number]  [facility] [phone]  Spoke with Minnie. Clinicals provided. Pending Auth: [id number].    will reach out within 48 hours. 9 days approved -  to . LCD and review on .  Naeem Fierro   2005  ID [id number]  [facility] [phone]  Spoke with Minnie. Clinicals provided. Pending Auth: [id number].    will reach out within 48 hours. 9 days approved -  to . LCD and review on .   - Courtney- patient approved for 30 days. LCD 10/15/24. Auth: [id number]. Discharge clinicals to be called in to [facility] [phone] Naeem Fierro   2005  ID [id number]  [facility] [phone]  Spoke with Minnie. Clinicals provided. Pending Auth: [id number].    will reach out within 48 hours. 9 days approved -  to . LCD and review on .   - Courtney- patient approved for 30 days. LCD 10/15/24. Auth: [id number]. Discharge clinicals to be called in to [facility] [phone]   Discharge clinicals provided live to [facility] [phone]. [phone].

## 2024-09-17 NOTE — BH CHART NOTE - NSEVENTNOTEFT_PSY_ALL_CORE
Obtained collateral pt's mother, Amy [phone], with pt's permission- she reported patient domiciled with her, her daughter and grandmother who recently was transferred to a nursing home the day prior to patient's suicide attempt. She reported patient with history of asthma (prior age of 8), ADHD, anxiety, depression. She reported patient with initial suicidal ideation in 2022 without intent or plan, was with 1st suicidal attempt in late 6/2023 triggered by jealousy and overpowering girlfriend, by ingesting a combination of medications and vitamins (about 8 pills) resulting in IPP. Followed by OP psychiatrist Dr Frances and therapist Vicki Cain, last visit with therapist a week ago. She reported that she was not home when it happened and found out from his daughter.  She stated that patient told his girlfriend that he took 18 pills of hydroxyzine, then she called her daughter who called Amy's boyfriend.  She confirmed that patient called 911 himself and was brought to ED via ambulance.  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 She reports possible trigger may be related to recent death of her mother in law from cancer and her mother's new diagnosis of cancer and transfer to nursing home facility a day before patient's suicidal attempt. 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  Collateral expressed close relationship with patient and also with his brother Minesh.  She stated that patient also has a good relationship with his friend and girlfriend, that he does not really enjoy going out, spends most of his time on the computer and playing video games.  She reports patient's father with history of depression and her sister with history of depression and bipolar disorder.  
Patient is a 19 year old man, native of [place], with no children, living with mother and sister in the house he grew up in. Describes ongoing difficulties in school despite having good vocabulary and being psychologically-minded. He denies significant trauma of any kind but has unpleasant and somewhat vague memories of mother and father breaking up when he was 8 years old. He is the youngest of four siblings. Recalls two events where he walked off sports event, stating he was a quitter. With the exception of possibly his father having had a drinking problem, he knows of no psychiatric of chemical dependency problems in the family. Reports he drinks lightly and only in social occasions and uses no other substances. 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 Gives a history of two impulsive suicide attempts, one trivially triggered, the other without identifiable trigger. Approximately a year and a half ago, his then girlfriend told him that if he kept calling out of work that he would be a bum. Having been in treatment with a psychiatrist, he reports impulsively, "as though something else were controlling" him, he took an overdose of his psychiatric medications. [email or web address] for a short period of time, he then went to a "recovery center" in [place] where there were groups and individual therapy. He reports two visits there, the first having been three months and the last ending approximately a year ago. He reports being miserable in his job for no particular reason and a couple of days ago with no notable trigger, he took 18 50 mg Atarax tablets in what he describes as a suicide attempt. Recently started on Wellbutrin. In the emergency room and on the treatment unit, he presented as calm, lucid, cognitively intact with perhaps mild depression with no active suicidal ideas, impulses, or desire to harm himself. It is not clear how depressed he was in the months prior to this attempt. He describes "everything" pressing upon him and causing him to "spiral down". Apart from saying he thinks too much about things, he had difficulty describing any thought content although there was no evidence of hallucinatory or delusional experience. He seemed quite calm on the unit and felt like he would not harm himself while he was there. Wellbutrin was continued and he was placed on melatonin with PRN medications for agitation.     Impression: Suicidal behavior, second episode. Rule out major depression.    Plan: Continue Bupropion. Obtain collateral information from mother and psychiatrist and observe for pharmacologic intervention and further diagnostic examination.

## 2024-09-17 NOTE — BH TREATMENT PLAN - NSCMSPTSTRENGTHS_PSY_ALL_CORE
Compliance to treatment/Expressive of emotions/Highly motivated for treatment/Motivated/Supportive family

## 2024-09-17 NOTE — BH INPATIENT PSYCHIATRY ASSESSMENT NOTE - NSBHCHARTREVIEWVS_PSY_A_CORE FT
Vital Signs Last 24 Hrs  T(C): 35.6 (09-17-24 @ 08:45), Max: 37.1 (09-16-24 @ 16:13)  T(F): 96.1 (09-17-24 @ 08:45), Max: 98.8 (09-16-24 @ 16:13)  HR: 74 (09-17-24 @ 08:45) (74 - 80)  BP: 145/79 (09-17-24 @ 08:45) (136/82 - 145/79)  BP(mean): --  RR: 18 (09-17-24 @ 08:45) (18 - 18)  SpO2: 100% (09-16-24 @ 16:13) (100% - 100%)

## 2024-09-17 NOTE — BH INPATIENT PSYCHIATRY ASSESSMENT NOTE - NSTXPROBSUICID_PSY_ALL_CORE
I will START or STAY ON the medications listed below when I get home from the hospital:    oxyCODONE 5 mg oral tablet  -- 1 tab(s) by mouth every 4 hours, As needed, Mild Pain (1 - 3)  -- Indication: For post surgical pain    oxyCODONE 10 mg oral tablet  -- 1 tab(s) by mouth every 4 hours, As needed, Moderate Pain (4 - 6)  -- Indication: For post surgical pain     aluminum hydroxide-magnesium hydroxide 200 mg-200 mg/5 mL oral suspension  -- 30 milliliter(s) by mouth 4 times a day, As needed, Indigestion  -- Indication: For bowel care    rivaroxaban 10 mg oral tablet  -- 1 tab(s) by mouth once a day  -- Indication: For dvt prophylaxis    clonazePAM 0.5 mg oral tablet  -- 1 tab(s) by mouth 2 times a day, As Needed anxiety   -- Indication: For home meds    simvastatin 10 mg oral tablet  -- 1 tab(s) by mouth once a day (at bedtime)  -- Indication: For home meds    zolpidem 10 mg oral tablet  -- 1 tab(s) by mouth once a day (at bedtime), As Needed insomnia  -- Indication: For home meds    carvedilol 6.25 mg oral tablet  -- 1 tab(s) by mouth 2 times a day  -- Indication: For home meds    Lidocaine Viscous 2% mucous membrane solution  -- 5 milliliter(s) mucous membrane 4 times a day (before meals and at bedtime), As Needed for mouth sores after chemotherapy  -- Indication: For shoulder pain     bisacodyl 10 mg rectal suppository  -- 1 suppository(ies) rectally once a day, As needed, If no bowel movement by POD#2  -- Indication: For bowel care    docusate sodium 100 mg oral capsule  -- 1 cap(s) by mouth 3 times a day  -- Indication: For bowel care    polyethylene glycol 3350 oral powder for reconstitution  -- 17 gram(s) by mouth once a day  -- Indication: For bowel care    pantoprazole 40 mg oral delayed release tablet  -- 1 tab(s) by mouth once a day  -- Indication: For gi prophylaxis    oxybutynin 5 mg oral tablet  -- 1 tab(s) by mouth 3 times a day  -- Indication: For home meds    flavoxATE 100 mg oral tablet  -- 1 tab(s) by mouth 4 times a day  -- Indication: For home meds    Vitamin B-100 oral tablet  -- 1 tab(s) by mouth once a day  -- Indication: For home meds
SUICIDE/SELF-INJURIOUS BEHAVIOR

## 2024-09-17 NOTE — BH SOCIAL WORK INITIAL PSYCHOSOCIAL EVALUATION - NSBHFINANCE_PSY_ALL_CORE
Last office visit 12/17/2021  Last refill Toprol-XL 10/18/2021  Last refill Zestril 10/18/2021  Patient doesn't have a follow up scheduled.  
Earned income

## 2024-09-17 NOTE — BH INPATIENT PSYCHIATRY ASSESSMENT NOTE - NSBHCHARTREVIEWINVESTIGATE_PSY_A_CORE FT
< from: 12 Lead ECG (09.15.24 @ 13:20) >      Ventricular Rate 68 BPM    Atrial Rate 68 BPM    P-R Interval 132 ms    QRS Duration 98 ms    Q-T Interval 380 ms    QTC Calculation(Bazett) 404 ms    P Axis 58 degrees    R Axis 43 degrees    T Axis 52 degrees    Diagnosis Line Normal sinus rhythm  Normal ECG    < end of copied text >

## 2024-09-17 NOTE — BH TREATMENT PLAN - NSTXPLANTHERAPYSESSIONSFT_PSY_ALL_CORE
09-17-24  Type of therapy: Coping skills, Creative arts therapy, Inspiration and motiviation, Leisure development, Self esteem, Social skills training, Stress management, Symptom management  Type of session: Individual  Level of patient participation: Resistance to participation  Duration of participation: Less than 15 minutes  Therapy conducted by: Psych rehab  Therapy Summary: Pt is currently on isolative protocol due to positive COVID results. Pt will need increased encouragement to attend groups on the unit once protocol has been cleared.

## 2024-09-17 NOTE — BH INPATIENT PSYCHIATRY ASSESSMENT NOTE - OTHER PAST PSYCHIATRIC HISTORY (INCLUDE DETAILS REGARDING ONSET, COURSE OF ILLNESS, INPATIENT/OUTPATIENT TREATMENT)
IPP at [facility] in June 23 for five days and in September 2023 he went to a MH program in [place] for 3-4 weeks, 1 pSA  IPP at [facility] in June 23 for five days and in September 2023 he went to a MH program in [place] for 3-4 weeks, pSA June 2023

## 2024-09-17 NOTE — BH INPATIENT PSYCHIATRY ASSESSMENT NOTE - NSBHATTESTAPPBILLTIME_PSY_A_CORE
I attest my time as KELLY is greater than 50% of the total combined time spent on qualifying patient care activities. I have reviewed and verified the documentation.

## 2024-09-17 NOTE — BH INPATIENT PSYCHIATRY ASSESSMENT NOTE - NSBHCHARTREVIEWLAB_PSY_A_CORE FT
Comprehensive Metabolic Panel (09.13.24 @ 06:57)   Sodium: 141 mmol/L  Potassium: 4.6 mmol/L  Chloride: 103 mmol/L  Carbon Dioxide: 28 mmol/L  Anion Gap: 10 mmol/L  Blood Urea Nitrogen: 9 mg/dL  Creatinine: 1.0 mg/dL  Glucose: 93 mg/dL  Calcium: 9.2 mg/dL  Protein Total: 6.7 g/dL  Albumin: 4.4 g/dL  Bilirubin Total: 0.6 mg/dL  Alkaline Phosphatase: 81 U/L  Aspartate Aminotransferase (AST/SGOT): 16 U/L  Alanine Aminotransferase (ALT/SGPT): 32 U/L  eGFR: 110  Complete Blood Count + Automated Diff (09.13.24 @ 06:57)   WBC Count: 6.99 K/uL  RBC Count: 5.28 M/uL  Hemoglobin: 15.8 g/dL  Hematocrit: 47.8 %  Mean Cell Volume: 90.5 fL  Mean Cell Hemoglobin: 29.9 pg  Mean Cell Hemoglobin Conc: 33.1 g/dL  Red Cell Distrib Width: 12.4 %  Platelet Count - Automated: 238 K/uL  MPV: 10.7 fL  Auto Neutrophil #: 3.28 K/uL  Auto Lymphocyte #: 2.43 K/uL  Auto Monocyte #: 0.91 K/uL  Auto Eosinophil #: 0.30 K/uL  Auto Basophil #: 0.05 K/uL  Auto Neutrophil %: 46.9%  Auto Lymphocyte %: 34.8 %  Auto Monocyte %: 13.0 %  Auto Eosinophil %: 4.3 %  Auto Basophil %: 0.7 %  Auto Immature Granulocyte %: 0.3%  Nucleated RBC: 0 /100 WBCs

## 2024-09-17 NOTE — BH INPATIENT PSYCHIATRY ASSESSMENT NOTE - NSSUICPROTFACT_PSY_ALL_CORE
Identifies reasons for living/Supportive social network of family or friends
no abdominal pain, no bloating, no constipation, no diarrhea, no nausea and no vomiting.

## 2024-09-17 NOTE — BH INPATIENT PSYCHIATRY ASSESSMENT NOTE - DESCRIPTION
graduated high school, did some college, works at [employer] lives with mom and sister

## 2024-09-18 PROCEDURE — 99231 SBSQ HOSP IP/OBS SF/LOW 25: CPT

## 2024-09-18 RX ORDER — BUPROPION HYDROCHLORIDE 150 MG/1
1 TABLET ORAL
Qty: 14 | Refills: 0
Start: 2024-09-18 | End: 2024-10-01

## 2024-09-18 RX ADMIN — BUPROPION HYDROCHLORIDE 150 MILLIGRAM(S): 150 TABLET ORAL at 08:25

## 2024-09-18 RX ADMIN — Medication 2 TABLET(S): at 20:48

## 2024-09-18 RX ADMIN — POLYETHYLENE GLYCOL 3350 17 GRAM(S): 17 POWDER, FOR SOLUTION ORAL at 08:25

## 2024-09-18 RX ADMIN — Medication 5 MILLIGRAM(S): at 20:48

## 2024-09-18 NOTE — BH INPATIENT PSYCHIATRY DISCHARGE NOTE - OTHER PAST PSYCHIATRIC HISTORY (INCLUDE DETAILS REGARDING ONSET, COURSE OF ILLNESS, INPATIENT/OUTPATIENT TREATMENT)
IPP at [facility] in June 23 for five days and in September 2023 he went to a MH program in [place] for 3-4 weeks, pSA June 2023

## 2024-09-18 NOTE — BH INPATIENT PSYCHIATRY DISCHARGE NOTE - NSDCCPCAREPLAN_GEN_ALL_CORE_FT
PRINCIPAL DISCHARGE DIAGNOSIS  Diagnosis: MDD (major depressive disorder)  Assessment and Plan of Treatment: Continue current medication regimen and follow up with aftercare appointments

## 2024-09-18 NOTE — BH INPATIENT PSYCHIATRY DISCHARGE NOTE - NSICDXPASTSURGICALHX_GEN_ALL_CORE_FT
Pt arrives with c/o hallucinations x 2 weeks since starting gabapentin as well as chest pain. Pt states hallucinations started 2 weeks ago and they have continued and worsened since then. Pt states chest pain was constant from last night until 11am today, no meds taken to relieve pain. Pt presents tearful and fearful. Pt denies pain at this time.    PAST SURGICAL HISTORY:  No significant past surgical history      Patient baseline mental status

## 2024-09-18 NOTE — BH INPATIENT PSYCHIATRY DISCHARGE NOTE - NSDCMRMEDTOKEN_GEN_ALL_CORE_FT
buPROPion 150 mg/24 hours (XL) oral tablet, extended release: 1 tab(s) orally once a day   buPROPion 150 mg/24 hours (XL) oral tablet, extended release: 1 tab(s) orally once a day x 14 days Continue to take until told otherwise by your provider

## 2024-09-18 NOTE — BH INPATIENT PSYCHIATRY DISCHARGE NOTE - HPI (INCLUDE ILLNESS QUALITY, SEVERITY, DURATION, TIMING, CONTEXT, MODIFYING FACTORS, ASSOCIATED SIGNS AND SYMPTOMS)
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     Pt seen and chart reviewed. Pt is calm and cooperative, but somewhat sedated s/p overdose on Hydroxyzine. He states that last night at 9:30pm he tried to kill himself "with a lot of pills of hydroxyzine." He states that he took 18 pills (50mg) in order to die. He stated that he researched the amount of medication needed to die only after he took the pills. Pt states he has done this in the past with different medications. He states that he has frequently wished hew as not alive. Yesterday, he was talking to his brother about life, and said that life is unbearable and it "spiraled" and he decided impulsively to take the overdose. He states that he still feels really sad. He stated that he just started work 2 days ago, after taking a long break. States he felt pressure to return to work and can "barely go to work." He reports good sleep and normal appetite. Denies NSSIB. States that right now he does not have suicidal ideation but he feels very tired. He reports that he has a GF and close friends, but has not been seeing them much recently. He went to [place] this summer with them and it was not as fun as he thought it would be. States that his actions were an "accumulation of stuff." He sees a psychiatrist Dr. Montenegro on [facility] and therapist Vicki. Was taking Abilify, Lexapro, and Vistaril, but a month ago went off Abilify and Lexapro due to concern for weight gain and now is only on Vistaril. Denies ETOH/Weed/illicit substance use. No access to guns. No auditory/visual hallucinations. No overt paranoia. No signs of symptoms of tay. He is not RIS. He denies homicidal ideation. Has never been in a fight and has never been violent.   Collateral received from patient's mother by MSW with patient's permission. See  note for more details.    On evaluation on IPP, pt presents linear and cooperative, well-groomed with fair eye contact. He states he came to the hospital after he impulsively took 18 pills of hydroxyzine. 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 He states he last worked at the job during Covid and he had to transport many patients to the [facility]. He states on day of OD, he had called out of his job since he was not feeling well. He states he then took 3 handfuls of 6 pills of atarax consecutively. He states he self-stopped from taking more pills because he was both taking a video of himself taking the pills as he was on a phone call with his girlfriend. He reports ingestion was impulsive, denies he was with suicidal bx prior to ingestion. He reports his relationships with his friends, family and girlfriend are fair with no recent conflicts. He admits to prior suicide attempt last year after interpersonal conflicts with ex-girlfriend who said he's a bum if he calls out of work. He reports ambivalence on being alive, then states he is feeling better because his girlfriend has been very supportive and he is thinking of starting kickboxing on discharge. He reports he has been sleeping and eating well. He reports difficulties concentrating, anergia and amotivation. He denies hx suggestive of tay or psychosis. He denies current active SI/HI, intent and plan.

## 2024-09-18 NOTE — BH INPATIENT PSYCHIATRY DISCHARGE NOTE - HOSPITAL COURSE
Pt has made significant progress over the course of hospitalization. With continuous psychotherapy from the treatment team and the medications, he reports feeling better, sleeping and eating well. Medications adjusted as follows- started on wellbutrin xl 150 mg daily to reduce symptoms of both depression and ADHD diagnosis which may be contributing to increased impulsivity. Pt tolerated medication well, denied negative side effects. Thought process and insight improved. Pt was calm and cooperative and was in good behavioral control. Denied any suicidal or homicidal ideations. Denied any auditory or visual hallucinations. Pt with good ADLs. Pt able to vocalize and utilize prosocial behaviors to address needs, and engage appropriately with staff and group milieu. Abnormalities in mental status improved sufficiently to permit outgoing care in the outpatient setting. Pt was evaluated by treatment team, pt is stable for discharge and shows no imminent danger to self, others or property at this time. He understands and agrees with treatment plan and following up with outpatient. Psychoeducation provided regarding diagnosis, medications, treatment and follow up. Risks, benefits, alternatives discussed, all questions and concerns addressed and answered. Reviewed crisis intervention with verbalized understanding. Pt has made significant progress over the course of hospitalization. With continuous psychotherapy from the treatment team and the medications, he reports feeling better, sleeping and eating well. Medications adjusted as follows- started on wellbutrin xl 150 mg daily to reduce symptoms of both depression and hx of ADHD diagnosis which may be contributing to increased impulsivity. Pt tolerated medication well, denied negative side effects. Thought process and insight improved. Pt was calm and cooperative and was in good behavioral control. Denied any suicidal or homicidal ideations. Denied any auditory or visual hallucinations. Pt with good ADLs. Pt able to vocalize and utilize prosocial behaviors to address needs, and engage appropriately with staff and group milieu. Abnormalities in mental status improved sufficiently to permit outgoing care in the outpatient setting. Pt was evaluated by treatment team, pt is stable for discharge and shows no imminent danger to self, others or property at this time. He understands and agrees with treatment plan and following up with outpatient. Psychoeducation provided regarding diagnosis, medications, treatment and follow up. Risks, benefits, alternatives discussed, all questions and concerns addressed and answered. Reviewed crisis intervention with verbalized understanding. Pt has made significant progress over the course of hospitalization. With continuous psychotherapy from the treatment team and the medications, he reports feeling better, sleeping and eating well. Medications adjusted as follows- started on wellbutrin xl 150 mg daily to reduce symptoms of both depression and hx of ADHD diagnosis which may be contributing to increased impulsivity. Pt tolerated medication well, denied negative side effects. Thought process and insight improved. Pt was calm and cooperative and was in good behavioral control. Denied any suicidal or homicidal ideations. Denied any auditory or visual hallucinations. Pt with good ADLs. Pt able to vocalize and utilize prosocial behaviors to address needs, and engage appropriately with staff and group milieu. Abnormalities in mental status improved sufficiently to permit outgoing care in the outpatient setting. Pt's mother denies safety concerns on discharge, endorses plan to manage/secure pt's medications. Pt was evaluated by treatment team, pt is stable for discharge and shows no imminent danger to self, others or property at this time. He understands and agrees with treatment plan and following up with outpatient. Psychoeducation provided regarding diagnosis, medications, treatment and follow up. Risks, benefits, alternatives discussed, all questions and concerns addressed and answered. Reviewed crisis intervention with verbalized understanding.

## 2024-09-18 NOTE — BH INPATIENT PSYCHIATRY DISCHARGE NOTE - NSBHDCMEDICALFT_PSY_A_CORE
S/p ingestion of 18 tabs of atarax, seen and cleared by toxicology without complications    #COVID   asymptomatic EXCEPT mild sore throat, congestion of sinuses, and slight loss of taste  - lungs clear to ausculation on room air, HDS.   - monitored off abx  - airborne precautions  -clinically stable

## 2024-09-18 NOTE — BH DISCHARGE NOTE NURSING/SOCIAL WORK/PSYCH REHAB - NSDCADDINFO2FT_PSY_ALL_CORE
Appointment with LUCILLE Martínez at 4:30pm  SW has been informed that patient is considering PHP and will assist with referral.

## 2024-09-18 NOTE — BH INPATIENT PSYCHIATRY DISCHARGE NOTE - ATTENDING DISCHARGE PHYSICAL EXAMINATION:
I interviewed patient with NP. Patient reports feeling "bored" on the unit, currently denies SI, makes appropriate future plans (to get into kickboxing, see out-pt , etc), admits that SA was "not a right thing to do". Agree with NP's assessment and plan.

## 2024-09-18 NOTE — BH INPATIENT PSYCHIATRY DISCHARGE NOTE - NSBHMSEAFFCONG_PSY_A_CORE
pt alert and can phonate well  h at/nc  perrl, conj clear, sclera anicteric,  neck supple  abd soft no r/g/t  gyn exam: pos blood in vault, os closed no cmt or tenderness on exam  ext no edema no deformities  neueo awake, lucid normal gait moves all extremities with strength  psych normal affect  vs reasonable Congruent

## 2024-09-18 NOTE — BH INPATIENT PSYCHIATRY DISCHARGE NOTE - NSBHMETABOLIC_PSY_ALL_CORE_FT
BMI: BMI (kg/m2): 29.2 (09-11-24 @ 23:14)  HbA1c: A1C with Estimated Average Glucose Result: 5.8 % (09-17-24 @ 04:30)    Glucose:   BP: 124/71 (09-18-24 @ 08:37) (124/71 - 145/79)Vital Signs Last 24 Hrs  T(C): 35.8 (09-18-24 @ 08:37), Max: 35.8 (09-18-24 @ 08:37)  T(F): 96.4 (09-18-24 @ 08:37), Max: 96.4 (09-18-24 @ 08:37)  HR: 79 (09-18-24 @ 08:37) (79 - 79)  BP: 124/71 (09-18-24 @ 08:37) (124/71 - 124/71)  BP(mean): --  RR: 18 (09-18-24 @ 08:37) (18 - 18)  SpO2: --      Lipid Panel:

## 2024-09-18 NOTE — BH DISCHARGE NOTE NURSING/SOCIAL WORK/PSYCH REHAB - NSCDUDCCRISIS_PSY_A_CORE
[facility] Well  1 [phone]) [facility] [phone][facility]  Text "WELL" to [phone]  Website: [email or web address] Suicide Prevention Lifeline 2 [phone] [phone] Suicide and Crisis Lifeline

## 2024-09-18 NOTE — BH INPATIENT PSYCHIATRY DISCHARGE NOTE - NSBHASSESSSUMMFT_PSY_ALL_CORE
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 Pt reported that he impulsively overdosed in the context of psychosocial stressors. Although he denied suicidal ideation, he had continued depression with some anhedonia and ongoing hopelessness. Pt transferred to [facility] for further management on 9/16. His presentation appeared most consistent with MDD, r/o personality d/o.     On evaluation, pt reports frustration on remaining on Covid isolation, otherwise endorses overall improved mood and is future oriented in returning home. He reports looking forward to playing video games, joining [employer], being with loved ones and f/u with OP providers. No overt psychosis noted. He denies passive and active SI/HI, intent and plan. Has not been a behavioral concern. Pt requesting for discharge. Anticipated discharge for tomorrow.    #MDD  -c/w wellbutrin xl 150 mg daily    #Agitation  -for agitation not amenable to verbal redirection, may give haldol 5 mg q6h prn and ativan 2 mg q6h prn with escalation to IM if pt is an acute danger to self or/and others with repeat EKG to ensure QTc <500 ms

## 2024-09-18 NOTE — BH INPATIENT PSYCHIATRY DISCHARGE NOTE - NSBHFUPINTERVALHXFT_PSY_A_CORE
Pt seen and evaluated, chart reviewed. As per nursing report, pt c/o anxiety overnight, several PRNs given with fair response. On evaluation, pt presents linear and cooperative, well-groomed. He reports being bored and frustrated that he has to maintain Covid isolation, denies sx. Otherwise he reports he is doing well with improved mood. He reports resolution of passive and active SI, is future-oriented and endorses looking forward to returning home to play video games, sign up for kickboxing, be with loved ones, and f/u OP providers. He endorses fair sleep and appetite. He denies AH or VH. He denies paranoia. He denies HI/I/P. He is adherent to medications and denies negative side effects. Pt has not been a behavioral concern. Pt requesting for discharge. Anticipated discharge for tomorrow.   Spoke with pt's mother, Amy- denies safety concerns on discharge tomorrow.

## 2024-09-18 NOTE — BH DISCHARGE NOTE NURSING/SOCIAL WORK/PSYCH REHAB - PATIENT PORTAL LINK FT
You can access the FollowMyHealth Patient Portal offered by [facility] by registering at the following website: [email or web address]. By joining [employer]’s FollowMyHealth portal, you will also be able to view your health information using other applications (apps) compatible with our system.

## 2024-09-19 VITALS
RESPIRATION RATE: 18 BRPM | SYSTOLIC BLOOD PRESSURE: 116 MMHG | HEART RATE: 99 BPM | TEMPERATURE: 98 F | DIASTOLIC BLOOD PRESSURE: 79 MMHG

## 2024-09-19 PROCEDURE — 99238 HOSP IP/OBS DSCHRG MGMT 30/<: CPT

## 2024-09-19 RX ADMIN — POLYETHYLENE GLYCOL 3350 17 GRAM(S): 17 POWDER, FOR SOLUTION ORAL at 08:42

## 2024-09-19 RX ADMIN — BUPROPION HYDROCHLORIDE 150 MILLIGRAM(S): 150 TABLET ORAL at 08:42

## 2024-09-19 NOTE — BH INPATIENT PSYCHIATRY PROGRESS NOTE - NSBHMETABOLIC_PSY_ALL_CORE_FT
BMI: BMI (kg/m2): 29.2 (09-11-24 @ 23:14)  HbA1c: A1C with Estimated Average Glucose Result: 5.8 % (09-17-24 @ 04:30)    Glucose:   BP: 124/71 (09-18-24 @ 08:37) (124/71 - 145/79)Vital Signs Last 24 Hrs  T(C): 35.8 (09-18-24 @ 08:37), Max: 35.8 (09-18-24 @ 08:37)  T(F): 96.4 (09-18-24 @ 08:37), Max: 96.4 (09-18-24 @ 08:37)  HR: 79 (09-18-24 @ 08:37) (79 - 79)  BP: 124/71 (09-18-24 @ 08:37) (124/71 - 124/71)  BP(mean): --  RR: 18 (09-18-24 @ 08:37) (18 - 18)  SpO2: --      Lipid Panel: 
BMI: BMI (kg/m2): 29.2 (09-11-24 @ 23:14)  HbA1c: A1C with Estimated Average Glucose Result: 5.8 % (09-17-24 @ 04:30)    Glucose:   BP: 124/71 (09-18-24 @ 08:37) (124/71 - 145/79)Vital Signs Last 24 Hrs  T(C): --  T(F): --  HR: --  BP: --  BP(mean): --  RR: --  SpO2: --      Lipid Panel:

## 2024-09-19 NOTE — BH INPATIENT PSYCHIATRY PROGRESS NOTE - CURRENT MEDICATION
MEDICATIONS  (STANDING):  buPROPion XL (24-Hour) 150 milliGRAM(s) Oral daily  melatonin. 5 milliGRAM(s) Oral at bedtime  polyethylene glycol 3350 17 Gram(s) Oral daily  senna 2 Tablet(s) Oral at bedtime    MEDICATIONS  (PRN):  diphenhydrAMINE 50 milliGRAM(s) Oral every 6 hours PRN agitation  haloperidol     Tablet 5 milliGRAM(s) Oral every 6 hours PRN agitation  LORazepam     Tablet 2 milliGRAM(s) Oral every 6 hours PRN agitation  
MEDICATIONS  (STANDING):  buPROPion XL (24-Hour) 150 milliGRAM(s) Oral daily  melatonin. 5 milliGRAM(s) Oral at bedtime  polyethylene glycol 3350 17 Gram(s) Oral daily  senna 2 Tablet(s) Oral at bedtime    MEDICATIONS  (PRN):  diphenhydrAMINE 50 milliGRAM(s) Oral every 6 hours PRN agitation  haloperidol     Tablet 5 milliGRAM(s) Oral every 6 hours PRN agitation  LORazepam     Tablet 2 milliGRAM(s) Oral every 6 hours PRN agitation

## 2024-09-19 NOTE — BH INPATIENT PSYCHIATRY PROGRESS NOTE - NSBHCHARTREVIEWVS_PSY_A_CORE FT
Vital Signs Last 24 Hrs  T(C): 35.8 (09-18-24 @ 08:37), Max: 35.8 (09-18-24 @ 08:37)  T(F): 96.4 (09-18-24 @ 08:37), Max: 96.4 (09-18-24 @ 08:37)  HR: 79 (09-18-24 @ 08:37) (79 - 79)  BP: 124/71 (09-18-24 @ 08:37) (124/71 - 124/71)  BP(mean): --  RR: 18 (09-18-24 @ 08:37) (18 - 18)  SpO2: --    
Vital Signs Last 24 Hrs  T(C): --  T(F): --  HR: --  BP: --  BP(mean): --  RR: --  SpO2: --

## 2024-09-19 NOTE — BH INPATIENT PSYCHIATRY PROGRESS NOTE - NSBHCHARTREVIEWINVESTIGATE_PSY_A_CORE FT
< from: 12 Lead ECG (09.15.24 @ 13:20) >      Ventricular Rate 68 BPM    Atrial Rate 68 BPM    P-R Interval 132 ms    QRS Duration 98 ms    Q-T Interval 380 ms    QTC Calculation(Bazett) 404 ms    P Axis 58 degrees    R Axis 43 degrees    T Axis 52 degrees    Diagnosis Line Normal sinus rhythm  Normal ECG    < end of copied text >    
< from: 12 Lead ECG (09.15.24 @ 13:20) >      Ventricular Rate 68 BPM    Atrial Rate 68 BPM    P-R Interval 132 ms    QRS Duration 98 ms    Q-T Interval 380 ms    QTC Calculation(Bazett) 404 ms    P Axis 58 degrees    R Axis 43 degrees    T Axis 52 degrees    Diagnosis Line Normal sinus rhythm  Normal ECG    < end of copied text >

## 2024-09-19 NOTE — BH CHART NOTE - RISK ASSESSMENT
Suicide and risk assessment performed prior to discharge. The patient with low acute risk and elevated chronic risk. Protective factors include help-seeking bx, denying SI/HI, future orientation, goal-directed, no SIB, able to verbalize reasons for living, improving insight, sobriety, able to safety plan. Risk factors include presenting/hx illness. Immediate risk was minimized by inpatient admission to a safe environment with appropriate supervision and limited access to lethal means. Future risk was minimized before discharge by treatment of acute episode, maximizing outpatient support, providing relevant patient education, discussing emergency procedures, and ensuring close follow-up. The patient remains at a low risk of self-harm, and such risk cannot be further ameliorated by continued inpatient treatment and the patient is therefore appropriate for discharge.

## 2024-09-19 NOTE — BH INPATIENT PSYCHIATRY PROGRESS NOTE - NSBHFUPINTERVALHXFT_PSY_A_CORE
Pt seen and evaluated, chart reviewed. As per nursing report, no acute events overnight, no PRNs. On evaluation, pt presents linear and cooperative, well-groomed. He reports he is doing well and ready for discharge today. He endorses overall improved mood and resolution of SI. He reports fair sleep and appetite. He denies AVH, paranoia. He denies SI/HI, intent and plan. He is future-oriented and endorses looking forward to returning home and f/u OP providers. He is adherent to medications and denies negative side effects. Pt has not been a behavioral concern. Discharge today. Pt and pt's verbalizes understanding and agrees to discharge instructions. 
Pt seen and evaluated, chart reviewed. As per nursing report, pt c/o anxiety overnight, several PRNs given with fair response. On evaluation, pt presents linear and cooperative, well-groomed. He reports being bored and frustrated that he has to maintain Covid isolation, denies sx. Otherwise he reports he is doing well with improved mood. He reports resolution of passive and active SI, is future-oriented and endorses looking forward to returning home to play video games, sign up for kickboxing, be with loved ones, and f/u OP providers. He endorses fair sleep and appetite. He denies AH or VH. He denies paranoia. He denies HI/I/P. He is adherent to medications and denies negative side effects. Pt has not been a behavioral concern. Pt requesting for discharge. Anticipated discharge for tomorrow.   Spoke with pt's mother, Amy- denies safety concerns on discharge tomorrow.

## 2024-09-19 NOTE — BH INPATIENT PSYCHIATRY PROGRESS NOTE - NSBHATTESTBILLING_PSY_A_CORE
75862-Yzsvndcoeu OBS or IP - moderate complexity OR 35-49 mins
46954-Apofwwumgr OBS or IP - moderate complexity OR 35-49 mins

## 2024-09-19 NOTE — BH INPATIENT PSYCHIATRY PROGRESS NOTE - NSBHCHARTREVIEWLAB_PSY_A_CORE FT
Comprehensive Metabolic Panel (09.13.24 @ 06:57)   Sodium: 141 mmol/L  Potassium: 4.6 mmol/L  Chloride: 103 mmol/L  Carbon Dioxide: 28 mmol/L  Anion Gap: 10 mmol/L  Blood Urea Nitrogen: 9 mg/dL  Creatinine: 1.0 mg/dL  Glucose: 93 mg/dL  Calcium: 9.2 mg/dL  Protein Total: 6.7 g/dL  Albumin: 4.4 g/dL  Bilirubin Total: 0.6 mg/dL  Alkaline Phosphatase: 81 U/L  Aspartate Aminotransferase (AST/SGOT): 16 U/L  Alanine Aminotransferase (ALT/SGPT): 32 U/L  eGFR: 110  Complete Blood Count + Automated Diff (09.13.24 @ 06:57)   WBC Count: 6.99 K/uL  RBC Count: 5.28 M/uL  Hemoglobin: 15.8 g/dL  Hematocrit: 47.8 %  Mean Cell Volume: 90.5 fL  Mean Cell Hemoglobin: 29.9 pg  Mean Cell Hemoglobin Conc: 33.1 g/dL  Red Cell Distrib Width: 12.4 %  Platelet Count - Automated: 238 K/uL  MPV: 10.7 fL  Auto Neutrophil #: 3.28 K/uL  Auto Lymphocyte #: 2.43 K/uL  Auto Monocyte #: 0.91 K/uL  Auto Eosinophil #: 0.30 K/uL  Auto Basophil #: 0.05 K/uL  Auto Neutrophil %: 46.9%  Auto Lymphocyte %: 34.8 %  Auto Monocyte %: 13.0 %  Auto Eosinophil %: 4.3 %  Auto Basophil %: 0.7 %  Auto Immature Granulocyte %: 0.3%  Nucleated RBC: 0 /100 WBCs    
Comprehensive Metabolic Panel (09.13.24 @ 06:57)   Sodium: 141 mmol/L  Potassium: 4.6 mmol/L  Chloride: 103 mmol/L  Carbon Dioxide: 28 mmol/L  Anion Gap: 10 mmol/L  Blood Urea Nitrogen: 9 mg/dL  Creatinine: 1.0 mg/dL  Glucose: 93 mg/dL  Calcium: 9.2 mg/dL  Protein Total: 6.7 g/dL  Albumin: 4.4 g/dL  Bilirubin Total: 0.6 mg/dL  Alkaline Phosphatase: 81 U/L  Aspartate Aminotransferase (AST/SGOT): 16 U/L  Alanine Aminotransferase (ALT/SGPT): 32 U/L  eGFR: 110  Complete Blood Count + Automated Diff (09.13.24 @ 06:57)   WBC Count: 6.99 K/uL  RBC Count: 5.28 M/uL  Hemoglobin: 15.8 g/dL  Hematocrit: 47.8 %  Mean Cell Volume: 90.5 fL  Mean Cell Hemoglobin: 29.9 pg  Mean Cell Hemoglobin Conc: 33.1 g/dL  Red Cell Distrib Width: 12.4 %  Platelet Count - Automated: 238 K/uL  MPV: 10.7 fL  Auto Neutrophil #: 3.28 K/uL  Auto Lymphocyte #: 2.43 K/uL  Auto Monocyte #: 0.91 K/uL  Auto Eosinophil #: 0.30 K/uL  Auto Basophil #: 0.05 K/uL  Auto Neutrophil %: 46.9%  Auto Lymphocyte %: 34.8 %  Auto Monocyte %: 13.0 %  Auto Eosinophil %: 4.3 %  Auto Basophil %: 0.7 %  Auto Immature Granulocyte %: 0.3%  Nucleated RBC: 0 /100 WBCs

## 2024-09-19 NOTE — BH INPATIENT PSYCHIATRY PROGRESS NOTE - NSBHASSESSSUMMFT_PSY_ALL_CORE
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 Pt reported that he impulsively overdosed in the context of psychosocial stressors. Although he denied suicidal ideation, he had continued depression with some anhedonia and ongoing hopelessness. Pt transferred to [facility] for further management on 9/16. His presentation appeared most consistent with MDD, r/o personality d/o.     On evaluation, pt reports frustration on remaining on Covid isolation, otherwise endorses overall improved mood and is future oriented in returning home. He reports looking forward to playing video games, joining [employer], being with loved ones and f/u with OP providers. No overt psychosis noted. He denies passive and active SI/HI, intent and plan. Has not been a behavioral concern. Pt requesting for discharge. Anticipated discharge for tomorrow.    #MDD  -c/w wellbutrin xl 150 mg daily    #Agitation  -for agitation not amenable to verbal redirection, may give haldol 5 mg q6h prn and ativan 2 mg q6h prn with escalation to IM if pt is an acute danger to self or/and others with repeat EKG to ensure QTc <500 ms
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 Pt reported that he impulsively overdosed in the context of psychosocial stressors. Although he denied suicidal ideation, he had continued depression with some anhedonia and ongoing hopelessness. Pt transferred to [facility] for further management on 9/16. His presentation appeared most consistent with MDD, r/o personality d/o.     On evaluation, pt endorses overall improved mood and resolution of SI. He is future oriented in returning home. He reports looking forward to playing video games, joining [employer], being with loved ones and f/u with OP providers. No overt psychosis noted. He denies passive and active SI/HI, intent and plan. Has not been a behavioral concern. Pt reports he is ready for discharge. Discharge today.    #MDD  -c/w wellbutrin xl 150 mg daily    #Agitation  -for agitation not amenable to verbal redirection, may give haldol 5 mg q6h prn and ativan 2 mg q6h prn with escalation to IM if pt is an acute danger to self or/and others with repeat EKG to ensure QTc <500 ms

## 2024-09-19 NOTE — BH INPATIENT PSYCHIATRY PROGRESS NOTE - NSBHATTESTAPPBILLTIME_PSY_A_CORE
I attest my time as KELLY is greater than 50% of the total combined time spent on qualifying patient care activities. I have reviewed and verified the documentation.
I attest my time as KELLY is greater than 50% of the total combined time spent on qualifying patient care activities. I have reviewed and verified the documentation.

## 2024-09-20 NOTE — BH SOCIAL WORK CONFIRMATION FOLLOW UP NOTE - NSCOMMENTS_PSY_ALL_CORE
Caring contact call was made by  (9/20); contact was made with patient via telephone to check in and review upcoming appointments.    [facility] contact call was made by  (9/20); contact was made with patient via telephone to check in and review upcoming appointments.

## 2024-09-21 DIAGNOSIS — T45.0X2A POISONING BY ANTIALLERGIC AND ANTIEMETIC DRUGS, INTENTIONAL SELF-HARM, INITIAL ENCOUNTER: ICD-10-CM

## 2024-09-21 DIAGNOSIS — F60.3 BORDERLINE PERSONALITY DISORDER: ICD-10-CM

## 2024-09-21 DIAGNOSIS — F39 UNSPECIFIED MOOD [AFFECTIVE] DISORDER: ICD-10-CM

## 2024-09-21 DIAGNOSIS — Z91.148 PATIENT'S OTHER NONCOMPLIANCE WITH MEDICATION REGIMEN FOR OTHER REASON: ICD-10-CM

## 2024-09-21 DIAGNOSIS — K21.9 GASTRO-ESOPHAGEAL REFLUX DISEASE WITHOUT ESOPHAGITIS: ICD-10-CM

## 2024-09-21 DIAGNOSIS — U07.1 COVID-19: ICD-10-CM

## 2024-09-21 DIAGNOSIS — E66.3 OVERWEIGHT: ICD-10-CM

## 2024-09-21 DIAGNOSIS — J45.909 UNSPECIFIED ASTHMA, UNCOMPLICATED: ICD-10-CM

## 2024-09-21 DIAGNOSIS — F31.9 BIPOLAR DISORDER, UNSPECIFIED: ICD-10-CM
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
## 2024-09-21 DIAGNOSIS — Z79.899 OTHER LONG TERM (CURRENT) DRUG THERAPY: ICD-10-CM

## 2024-09-24 DIAGNOSIS — F60.89 OTHER SPECIFIC PERSONALITY DISORDERS: ICD-10-CM

## 2024-09-24 DIAGNOSIS — F33.9 MAJOR DEPRESSIVE DISORDER, RECURRENT, UNSPECIFIED: ICD-10-CM

## 2024-09-24 DIAGNOSIS — G47.00 INSOMNIA, UNSPECIFIED: ICD-10-CM

## 2024-09-24 DIAGNOSIS — Z91.51 PERSONAL HISTORY OF SUICIDAL BEHAVIOR: ICD-10-CM

## 2024-09-24 DIAGNOSIS — F41.9 ANXIETY DISORDER, UNSPECIFIED: ICD-10-CM

## 2024-09-24 DIAGNOSIS — T43.226A UNDERDOSING OF SELECTIVE SEROTONIN REUPTAKE INHIBITORS, INITIAL ENCOUNTER: ICD-10-CM

## 2024-09-24 DIAGNOSIS — R45.1 RESTLESSNESS AND AGITATION: ICD-10-CM

## 2024-09-24 DIAGNOSIS — T43.596A UNDERDOSING OF OTHER ANTIPSYCHOTICS AND NEUROLEPTICS, INITIAL ENCOUNTER: ICD-10-CM

## 2024-09-24 DIAGNOSIS — U07.1 COVID-19: ICD-10-CM

## 2024-09-24 DIAGNOSIS — Z63.5 DISRUPTION OF FAMILY BY SEPARATION AND DIVORCE: ICD-10-CM

## 2024-09-24 DIAGNOSIS — T45.0X2A POISONING BY ANTIALLERGIC AND ANTIEMETIC DRUGS, INTENTIONAL SELF-HARM, INITIAL ENCOUNTER: ICD-10-CM

## 2024-09-24 DIAGNOSIS — Z91.148 PATIENT'S OTHER NONCOMPLIANCE WITH MEDICATION REGIMEN FOR OTHER REASON: ICD-10-CM

## 2024-09-24 SDOH — SOCIAL STABILITY - SOCIAL INSECURITY: DISRUPTION OF FAMILY BY SEPARATION AND DIVORCE: Z63.5

## 2024-10-16 NOTE — ED BEHAVIORAL HEALTH ASSESSMENT NOTE - KNOWN PSYCHIATRIC ADMISSION WITHIN THE PAST 30 DAYS
PA for   lidocaine (Lidoderm) 5 %     SUBMITTED     via    [][facility]-KEY:     [id number]   [x]Surescripts-Case ID    []Availity-Auth ID # NDC #   []Faxed to plan   []Other website   []Phone call Case ID #     Office notes sent, clinical questions answered. Awaiting determination    Turnaround time for your insurance to make a decision on your Prior Authorization can take 7-21 business days.            No

## 2024-12-04 ENCOUNTER — EMERGENCY (EMERGENCY)
Facility: HOSPITAL | Age: 19
LOS: 0 days | Discharge: HOME | End: 2024-12-04
Attending: EMERGENCY MEDICINE
Payer: COMMERCIAL

## 2024-12-04 VITALS
OXYGEN SATURATION: 99 % | DIASTOLIC BLOOD PRESSURE: 84 MMHG | RESPIRATION RATE: 18 BRPM | HEART RATE: 77 BPM | SYSTOLIC BLOOD PRESSURE: 137 MMHG | TEMPERATURE: 98 F

## 2024-12-04 VITALS — WEIGHT: 238.1 LBS

## 2024-12-04 DIAGNOSIS — R11.2 NAUSEA WITH VOMITING, UNSPECIFIED: ICD-10-CM

## 2024-12-04 DIAGNOSIS — J45.909 UNSPECIFIED ASTHMA, UNCOMPLICATED: ICD-10-CM

## 2024-12-04 DIAGNOSIS — R10.13 EPIGASTRIC PAIN: ICD-10-CM

## 2024-12-04 DIAGNOSIS — F32.A DEPRESSION, UNSPECIFIED: ICD-10-CM

## 2024-12-04 DIAGNOSIS — K21.9 GASTRO-ESOPHAGEAL REFLUX DISEASE WITHOUT ESOPHAGITIS: ICD-10-CM

## 2024-12-04 DIAGNOSIS — R10.11 RIGHT UPPER QUADRANT PAIN: ICD-10-CM

## 2024-12-04 LAB
ALBUMIN SERPL ELPH-MCNC: 4.8 G/DL — SIGNIFICANT CHANGE UP (ref 3.5–5.2)
ALP SERPL-CCNC: 94 U/L — SIGNIFICANT CHANGE UP (ref 30–115)
ALT FLD-CCNC: 30 U/L — SIGNIFICANT CHANGE UP (ref 0–41)
ANION GAP SERPL CALC-SCNC: 9 MMOL/L — SIGNIFICANT CHANGE UP (ref 7–14)
AST SERPL-CCNC: 19 U/L — SIGNIFICANT CHANGE UP (ref 0–41)
BASOPHILS # BLD AUTO: 0.07 K/UL — SIGNIFICANT CHANGE UP (ref 0–0.2)
BASOPHILS NFR BLD AUTO: 0.8 % — SIGNIFICANT CHANGE UP (ref 0–1)
BILIRUB SERPL-MCNC: 0.4 MG/DL — SIGNIFICANT CHANGE UP (ref 0.2–1.2)
BUN SERPL-MCNC: 12 MG/DL — SIGNIFICANT CHANGE UP (ref 10–20)
CALCIUM SERPL-MCNC: 9.5 MG/DL — SIGNIFICANT CHANGE UP (ref 8.4–10.5)
CHLORIDE SERPL-SCNC: 105 MMOL/L — SIGNIFICANT CHANGE UP (ref 98–110)
CO2 SERPL-SCNC: 28 MMOL/L — SIGNIFICANT CHANGE UP (ref 17–32)
CREAT SERPL-MCNC: 1 MG/DL — SIGNIFICANT CHANGE UP (ref 0.7–1.5)
EGFR: 110 ML/MIN/1.73M2 — SIGNIFICANT CHANGE UP
EOSINOPHIL # BLD AUTO: 0.25 K/UL — SIGNIFICANT CHANGE UP (ref 0–0.7)
EOSINOPHIL NFR BLD AUTO: 2.7 % — SIGNIFICANT CHANGE UP (ref 0–8)
GLUCOSE SERPL-MCNC: 88 MG/DL — SIGNIFICANT CHANGE UP (ref 70–99)
HCT VFR BLD CALC: 48.1 % — SIGNIFICANT CHANGE UP (ref 42–52)
HGB BLD-MCNC: 16.2 G/DL — SIGNIFICANT CHANGE UP (ref 14–18)
IMM GRANULOCYTES NFR BLD AUTO: 0.2 % — SIGNIFICANT CHANGE UP (ref 0.1–0.3)
LIDOCAIN IGE QN: 40 U/L — SIGNIFICANT CHANGE UP (ref 7–60)
LYMPHOCYTES # BLD AUTO: 1.69 K/UL — SIGNIFICANT CHANGE UP (ref 1.2–3.4)
LYMPHOCYTES # BLD AUTO: 18.3 % — LOW (ref 20.5–51.1)
MAGNESIUM SERPL-MCNC: 2 MG/DL — SIGNIFICANT CHANGE UP (ref 1.8–2.4)
MCHC RBC-ENTMCNC: 29.4 PG — SIGNIFICANT CHANGE UP (ref 27–31)
MCHC RBC-ENTMCNC: 33.7 G/DL — SIGNIFICANT CHANGE UP (ref 32–37)
MCV RBC AUTO: 87.3 FL — SIGNIFICANT CHANGE UP (ref 80–94)
MONOCYTES # BLD AUTO: 0.79 K/UL — HIGH (ref 0.1–0.6)
MONOCYTES NFR BLD AUTO: 8.5 % — SIGNIFICANT CHANGE UP (ref 1.7–9.3)
NEUTROPHILS # BLD AUTO: 6.44 K/UL — SIGNIFICANT CHANGE UP (ref 1.4–6.5)
NEUTROPHILS NFR BLD AUTO: 69.5 % — SIGNIFICANT CHANGE UP (ref 42.2–75.2)
NRBC # BLD: 0 /100 WBCS — SIGNIFICANT CHANGE UP (ref 0–0)
PLATELET # BLD AUTO: 301 K/UL — SIGNIFICANT CHANGE UP (ref 130–400)
PMV BLD: 10.4 FL — SIGNIFICANT CHANGE UP (ref 7.4–10.4)
POTASSIUM SERPL-MCNC: 4.4 MMOL/L — SIGNIFICANT CHANGE UP (ref 3.5–5)
POTASSIUM SERPL-SCNC: 4.4 MMOL/L — SIGNIFICANT CHANGE UP (ref 3.5–5)
PROT SERPL-MCNC: 7.5 G/DL — SIGNIFICANT CHANGE UP (ref 6–8)
RBC # BLD: 5.51 M/UL — SIGNIFICANT CHANGE UP (ref 4.7–6.1)
RBC # FLD: 12.4 % — SIGNIFICANT CHANGE UP (ref 11.5–14.5)
SODIUM SERPL-SCNC: 142 MMOL/L — SIGNIFICANT CHANGE UP (ref 135–146)
WBC # BLD: 9.26 K/UL — SIGNIFICANT CHANGE UP (ref 4.8–10.8)
WBC # FLD AUTO: 9.26 K/UL — SIGNIFICANT CHANGE UP (ref 4.8–10.8)

## 2024-12-04 PROCEDURE — 83690 ASSAY OF LIPASE: CPT

## 2024-12-04 PROCEDURE — 85025 COMPLETE CBC W/AUTO DIFF WBC: CPT

## 2024-12-04 PROCEDURE — 76705 ECHO EXAM OF ABDOMEN: CPT

## 2024-12-04 PROCEDURE — 36415 COLL VENOUS BLD VENIPUNCTURE: CPT

## 2024-12-04 PROCEDURE — 96374 THER/PROPH/DIAG INJ IV PUSH: CPT

## 2024-12-04 PROCEDURE — 80053 COMPREHEN METABOLIC PANEL: CPT

## 2024-12-04 PROCEDURE — 83735 ASSAY OF MAGNESIUM: CPT

## 2024-12-04 PROCEDURE — 99284 EMERGENCY DEPT VISIT MOD MDM: CPT

## 2024-12-04 PROCEDURE — 99284 EMERGENCY DEPT VISIT MOD MDM: CPT | Mod: 25

## 2024-12-04 PROCEDURE — 96375 TX/PRO/DX INJ NEW DRUG ADDON: CPT

## 2024-12-04 PROCEDURE — 76705 ECHO EXAM OF ABDOMEN: CPT | Mod: 26

## 2024-12-04 RX ORDER — ONDANSETRON HYDROCHLORIDE 4 MG/1
4 TABLET, FILM COATED ORAL ONCE
Refills: 0 | Status: COMPLETED | OUTPATIENT
Start: 2024-12-04 | End: 2024-12-04

## 2024-12-04 RX ORDER — FAMOTIDINE 20 MG/1
20 TABLET, FILM COATED ORAL ONCE
Refills: 0 | Status: COMPLETED | OUTPATIENT
Start: 2024-12-04 | End: 2024-12-04

## 2024-12-04 RX ORDER — ONDANSETRON HYDROCHLORIDE 4 MG/1
1 TABLET, FILM COATED ORAL
Qty: 12 | Refills: 0
Start: 2024-12-04 | End: 2024-12-07

## 2024-12-04 RX ORDER — OMEPRAZOLE 20 MG/1
1 CAPSULE, DELAYED RELEASE ORAL
Qty: 30 | Refills: 0
Start: 2024-12-04 | End: 2025-01-02

## 2024-12-04 RX ORDER — MAGNESIUM, ALUMINUM HYDROXIDE 200-225/5
30 SUSPENSION, ORAL (FINAL DOSE FORM) ORAL ONCE
Refills: 0 | Status: COMPLETED | OUTPATIENT
Start: 2024-12-04 | End: 2024-12-04

## 2024-12-04 RX ORDER — 0.9 % SODIUM CHLORIDE 0.9 %
1000 INTRAVENOUS SOLUTION INTRAVENOUS ONCE
Refills: 0 | Status: COMPLETED | OUTPATIENT
Start: 2024-12-04 | End: 2024-12-04

## 2024-12-04 RX ADMIN — FAMOTIDINE 20 MILLIGRAM(S): 20 TABLET, FILM COATED ORAL at 13:45

## 2024-12-04 RX ADMIN — ONDANSETRON HYDROCHLORIDE 4 MILLIGRAM(S): 4 TABLET, FILM COATED ORAL at 13:45

## 2024-12-04 RX ADMIN — Medication 30 MILLILITER(S): at 13:45

## 2024-12-04 RX ADMIN — Medication 1000 MILLILITER(S): at 13:45

## 2024-12-04 NOTE — ED PROVIDER NOTE - PROGRESS NOTE DETAILS
CR: Left ankle will DC home with omeprazole and Zofran PMH patient to follow-up with GI Dr. Whitfield.  Return precautions given.  Patient to follow-up with PMD in the next few days. CR: Patient reassessed, reporting improvement in sx. Will DC home with omeprazole and Zofran, patient to follow-up with GI Dr. Whitfield.  Return precautions given.  Patient to follow-up with PMD in the next few days.

## 2024-12-04 NOTE — ED PROVIDER NOTE - NSFOLLOWUPINSTRUCTIONS_ED_ALL_ED_FT
Follow up with your PMD this week.    Follow up with Dr. Whitfield this week.     Gastroesophageal Reflux Disease, Adult    Normally, food travels down the esophagus and stays in the stomach to be digested. However, when a person has gastroesophageal reflux disease (GERD), food and stomach acid move back up into the esophagus. When this happens, the esophagus becomes sore and inflamed. Over time, GERD can create small holes (ulcers) in the lining of the esophagus.    What are the causes?  This condition is caused by a problem with the muscle between the esophagus and the stomach (lower esophageal sphincter, or LES). Normally, the LES muscle closes after food passes through the esophagus to the stomach. When the LES is weakened or abnormal, it does not close properly, and that allows food and stomach acid to go back up into the esophagus. The LES can be weakened by certain dietary substances, medicines, and medical conditions, including:  Tobacco use.  Pregnancy.  Having a hiatal hernia.  Heavy alcohol use.  Certain foods and beverages, such as coffee, chocolate, onions, and peppermint.  What increases the risk?  This condition is more likely to develop in:  People who have an increased body weight.  People who have connective tissue disorders.  People who use NSAID medicines.  What are the signs or symptoms?  Symptoms of this condition include:  Heartburn.  Difficult or painful swallowing.  The feeling of having a lump in the throat.  A bitter taste in the mouth.  Bad breath.  Having a large amount of saliva.  Having an upset or bloated stomach.  Belching.  Chest pain.  Shortness of breath or wheezing.  Ongoing (chronic) cough or a night-time cough.  Wearing away of tooth enamel.  Weight loss.  Different conditions can cause chest pain. Make sure to see your health care provider if you experience chest pain.    How is this diagnosed?  Your health care provider will take a medical history and perform a physical exam. To determine if you have mild or severe GERD, your health care provider may also monitor how you respond to treatment. You may also have other tests, including:  An endoscopy to examine your stomach and esophagus with a small camera.  A test that measures the acidity level in your esophagus.  A test that measures how much pressure is on your esophagus.  A barium swallow or modified barium swallow to show the shape, size, and functioning of your esophagus.  How is this treated?  The goal of treatment is to help relieve your symptoms and to prevent complications. Treatment for this condition may vary depending on how severe your symptoms are. Your health care provider may recommend:  Changes to your diet.  Medicine.  Surgery.  Follow these instructions at home:  Diet     Follow a diet as recommended by your health care provider. This may involve avoiding foods and drinks such as:  Coffee and tea (with or without caffeine).  Drinks that contain alcohol.  Energy drinks and sports drinks.  Carbonated drinks or sodas.  Chocolate and cocoa.  Peppermint and mint flavorings.  Garlic and onions.  Horseradish.  Spicy and acidic foods, including peppers, chili powder, wright powder, vinegar, hot sauces, and barbecue sauce.  Citrus fruit juices and citrus fruits, such as oranges, xander, and limes.  Tomato-based foods, such as red sauce, chili, salsa, and pizza with red sauce.  Fried and fatty foods, such as donuts, french fries, potato chips, and high-fat dressings.  High-fat meats, such as hot dogs and fatty cuts of red and white meats, such as rib eye steak, sausage, ham, and dexter.  High-fat dairy items, such as whole milk, butter, and cream cheese.  Eat small, frequent meals instead of large meals.  Avoid drinking large amounts of liquid with your meals.  Avoid eating meals during the 2–3 hours before bedtime.  Avoid lying down right after you eat.  Do not exercise right after you eat.  General instructions     Pay attention to any changes in your symptoms.  Take over-the-counter and prescription medicines only as told by your health care provider. Do not take aspirin, ibuprofen, or other NSAIDs unless your health care provider told you to do so.  Do not use any tobacco products, including cigarettes, chewing tobacco, and e-cigarettes. If you need help quitting, ask your health care provider.  Wear loose-fitting clothing. Do not wear anything tight around your waist that causes pressure on your abdomen.  Raise (elevate) the head of your bed 6 inches (15cm).  Try to reduce your stress, such as with yoga or meditation. If you need help reducing stress, ask your health care provider.  If you are overweight, reduce your weight to an amount that is healthy for you. Ask your health care provider for guidance about a safe weight loss goal.  Keep all follow-up visits as told by your health care provider. This is important.  Contact a health care provider if:  You have new symptoms.  You have unexplained weight loss.  You have difficulty swallowing, or it hurts to swallow.  You have wheezing or a persistent cough.  Your symptoms do not improve with treatment.  You have a hoarse voice.  Get help right away if:  You have pain in your arms, neck, jaw, teeth, or back.  You feel sweaty, dizzy, or light-headed.  You have chest pain or shortness of breath.  You vomit and your vomit looks like blood or coffee grounds.  You faint.  Your stool is bloody or black.  You cannot swallow, drink, or eat.  This information is not intended to replace advice given to you by your health care provider. Make sure you discuss any questions you have with your health care provider.

## 2024-12-04 NOTE — ED PROVIDER NOTE - PATIENT PORTAL LINK FT
You can access the FollowMyHealth Patient Portal offered by [facility] by registering at the following website: [email or web address]. By joining [facility]’s FollowMyHealth portal, you will also be able to view your health information using other applications (apps) compatible with our system.

## 2024-12-04 NOTE — ED ADULT NURSE NOTE - NSFALLUNIVINTERV_ED_ALL_ED
Bed/Stretcher in lowest position, wheels locked, appropriate side rails in place/Call bell, personal items and telephone in reach/Instruct patient to call for assistance before getting out of bed/chair/stretcher/Non-slip footwear applied when patient is off stretcher/[place] to call system/Physically safe environment - no spills, clutter or unnecessary equipment/Purposeful proactive rounding/Room/bathroom lighting operational, light cord in reach

## 2024-12-04 NOTE — ED ADULT NURSE NOTE - IN ACCORDANCE WITH [place] LAW, WE OFFER EVERY PATIENT A HEPATITIS C TEST. WOULD YOU LIKE TO BE TESTED TODAY?
Pt denied transfer to [facility] for [facility]  As per Hailey Almaraz, "The patient was denied by the MD for acuity, they feel patient is better off returning to home facility under care of her current nephrologist, I have escalated the request to the SHEILA to see if she agrees with this denial"  CM will continue to f/u with Hailey Almaraz and inform  upon their response  Opt out

## 2024-12-04 NOTE — ED PROVIDER NOTE - OBJECTIVE STATEMENT
Patient is a 19-year-old male PMH anxiety, asthma, GERD who presents ED with complaints of nausea with intermittent episodes of emesis over the past week.  Patient endorses epigastric discomfort, exacerbated with eating.  Patient has had endoscopy in the past and was diagnosed with gastritis, was on PPI years ago but discontinued as his symptoms had resolved.  Denies fever, chills, chest pain, shortness of breath, hematemesis, diarrhea.

## 2024-12-04 NOTE — ED PROVIDER NOTE - PHYSICAL EXAMINATION
VITAL SIGNS: I have reviewed nursing notes and confirm.  CONSTITUTIONAL: In no acute distress.  SKIN: Skin exam is warm and dry.  HEAD: Normocephalic; atraumatic.  EYES: PERRL, EOM intact; conjunctiva and sclera clear.  ENT: No nasal discharge; airway clear.   NECK: Supple; non tender.  CARD: S1, S2; Regular rate and rhythm.  RESP: CTAB. Speaking in full sentences.   ABD: Normal bowel sounds; soft; non-distended; epigastric, RUQ TTP; No rebound or guarding. No CVA tenderness.  EXT: Normal ROM.   NEURO: Alert, oriented. Grossly unremarkable. No focal deficits.

## 2024-12-04 NOTE — ED PROVIDER NOTE - ATTENDING APP SHARED VISIT CONTRIBUTION OF CARE
19-year-old male history of asthma, depression, presents with mom for evaluation of vomiting and abdominal pain for the last week.  Patient states that he has had issues with his stomach in the past and was on omeprazole.  Patient has not been omeprazole in a while.  Mom states that patient had EGD when he was 13 or 14 years old.  On exam patient in NAD, AAOx3, abdomen soft, nontender nondistended, OP clear, positive bowel sounds

## 2024-12-04 NOTE — ED PROVIDER NOTE - CLINICAL SUMMARY MEDICAL DECISION MAKING FREE TEXT BOX
Labs and imaging obtained.  Patient symptoms are treated.  Patient is feeling much improved.  Results reviewed and discussed with patient and mom.  Will resume omeprazole.  Will send Rx for Zofran as needed.  Patient mom understands that he will need to follow-up with GI for possible scope in the future.  Patient to return if any worsening symptoms or concerns.

## 2024-12-04 NOTE — ED PROVIDER NOTE - CARE PROVIDER_API CALL
Fidel Whitfield  Gastroenterology  [address] [place] [id number]  Phone: [phone]  Fax: [phone]  Follow Up Time: 1-3 Days

## 2025-06-24 NOTE — PROGRESS NOTE ADULT - ASSESSMENT
18yo M w/a PMH of MDD, suicide attempts past, noncompliant with Lexapro and Abilify, takes hydroxyzine 50 mg nightly for sleep, presented after suicide attempt last night.  Patient stated he took 18 pills of 50 mg hydroxyzine 1.5 hours prior to arrival to ED.  Admitted for IPP placement and found to be COVID +. Denies active suicidal ideation at this time.     #Attempted suicide s/p Atarax overdose   - States that he took 18 pills of Hydroxyzine   - Toxicology followed. On ECG,   - Currently asymptomatic. Not agitated, no mydriasis, peeing normally, not constipated. HDS.   - UTOX negative   - psychosocial stressors as per HPI   - Currently no plan to harm himself, states it was an impulsive decision and there was no real reason as to why      - 1:1 sit     #COVID   asymptomatic EXCEPT mild sore throat, congestion of sinuses, and slight loss of taste  - lungs clear to ausculation on room air, HDS.   - monitored off abx  - airborne precautions  -clinically stable    #HCM  - DVT Prophylaxis: SCD  - GI Prophylaxis: not indicated  - Diet: Regular   - Activity: AAT   - Code Status: Full       - Dispo: voluntary inpatient psychiatry admission once bed available. Stable